# Patient Record
Sex: FEMALE | Race: WHITE | NOT HISPANIC OR LATINO | URBAN - METROPOLITAN AREA
[De-identification: names, ages, dates, MRNs, and addresses within clinical notes are randomized per-mention and may not be internally consistent; named-entity substitution may affect disease eponyms.]

---

## 2019-11-10 ENCOUNTER — EMERGENCY (EMERGENCY)
Facility: HOSPITAL | Age: 44
LOS: 0 days | Discharge: HOME | End: 2019-11-10
Attending: EMERGENCY MEDICINE
Payer: COMMERCIAL

## 2019-11-10 VITALS
TEMPERATURE: 98 F | RESPIRATION RATE: 16 BRPM | HEART RATE: 99 BPM | DIASTOLIC BLOOD PRESSURE: 76 MMHG | SYSTOLIC BLOOD PRESSURE: 119 MMHG | OXYGEN SATURATION: 100 %

## 2019-11-10 VITALS — HEIGHT: 60 IN | WEIGHT: 108.03 LBS

## 2019-11-10 DIAGNOSIS — R21 RASH AND OTHER NONSPECIFIC SKIN ERUPTION: ICD-10-CM

## 2019-11-10 DIAGNOSIS — L02.01 CUTANEOUS ABSCESS OF FACE: ICD-10-CM

## 2019-11-10 PROCEDURE — 99282 EMERGENCY DEPT VISIT SF MDM: CPT

## 2019-11-10 PROCEDURE — 99283 EMERGENCY DEPT VISIT LOW MDM: CPT

## 2019-11-10 RX ORDER — AZTREONAM 2 G
1 VIAL (EA) INJECTION
Qty: 14 | Refills: 0
Start: 2019-11-10 | End: 2019-11-16

## 2019-11-10 RX ADMIN — Medication 1 TABLET(S): at 13:48

## 2019-11-10 NOTE — ED STATDOCS - ATTENDING CONTRIBUTION TO CARE
I, Jai Ferrell MD,  performed the initial face to face bedside interview with this patient regarding history of present illness, review of symptoms and relevant past medical, social and family history.  I completed an independent physical examination.  I was the initial provider who evaluated this patient. I have signed out the follow up of any pending tests (i.e. labs, radiological studies) to the ACP.  I have communicated the patient’s plan of care and disposition with the ACP.

## 2019-11-10 NOTE — ED STATDOCS - NS ED ROS FT
Review of Systems:  	•	CONSTITUTIONAL: no fever  	•	SKIN: +R sided cheek erythema and swelling    	•	RESPIRATORY: no shortness of breath  	•	CARDIAC: no chest pain, no palpitations  	•	GI:  no abd pain, no nausea, no vomiting, no diarrhea  	•	GENITO-URINARY:  no dysuria; no hematuria    	•	MUSCULOSKELETAL:  no back pain  	•	NEUROLOGIC: no weakness  	•	ALLERGY: no rhinitis  	•	PSYSCHIATRIC: no anxiety Review of Systems:  	•	CONSTITUTIONAL: no fever  	•	SKIN: +R sided cheek erythema and swelling    	•	RESPIRATORY: no shortness of breath  	•	CARDIAC: no chest pain, no palpitations  	•	GI:  no abd pain, no nausea, no vomiting, no diarrhea    	•	PSYSCHIATRIC: +anxiety

## 2019-11-10 NOTE — ED ADULT NURSE NOTE - CAS DISCH ACCOMP BY
Face to Face Note  -  Durable Medical Equipment    Kristopher Hanley M.D. - NPI: [id number]  I certify that this patient is under my care and that they had a durable medical equipment(DME)face to face encounter by myself that meets the physician DME face-to-face encounter requirements with this patient on:    Date of encounter:   Patient:                    MRN:                       YOB: 2018  [facility]  [id number]  1949     The encounter with the patient was in whole, or in part, for the following medical condition, which is the primary reason for durable medical equipment:  Total Joint Replacement    I certify that, based on my findings, the following durable medical equipment is medically necessary:  3 in 1 Bedside [place].    HOME O2 Saturation Measurements:(Values must be present for Home Oxygen orders)         ,     ,         My Clinical findings support the need for the above equipment due to:  Other - post BRANDEN    Supporting Symptoms: post BRANDEN      Self

## 2019-11-10 NOTE — ED ADULT TRIAGE NOTE - CHIEF COMPLAINT QUOTE
pt c/o rash to right cheek x 5 days pt c/o rash to right cheek x 4 days, rash started as a small bump , patient used warm compress to pop it , not bump has became larger , red, and hard , also feels right periorbital swelling

## 2019-11-10 NOTE — ED STATDOCS - OBJECTIVE STATEMENT
Pertinent HPI/PMH/PSH/FHx/SHx and Review of Systems contained within  HPI:  44 yr old F presents to the ED with R sided facial pain.  Patient reports that she had bump on her right side, and worsened over the past few days with increased swelling.  +dental pain Patient did not taken any medication for the pain. Patient applied hot compress to the area, but did not have any drainage from the area.   PMH/PSH relevant for:  ROS negative for: fever, Chest pain, SOB, Nausea, vomiting, diarrhea, abdominal pain, dysuria, eye pain, throat pain   FamilyHx and SocialHx not otherwise contributory Pertinent HPI/PMH/PSH/FHx/SHx and Review of Systems contained within  HPI:  44 yr old F presents to the ED with R sided facial & redness x4days, new onset.  Patient reports that she had bump on her right maxillary region which she popped with some drainage. worsened over the past few days with increased swelling.  Patient did not taken any medication for the pain. Patient applied hot compress to the area.  PMH/PSH relevant for: none  ROS negative for: fever, Chest pain, SOB, Nausea, vomiting, diarrhea,  eye pain, throat pain, neck pain, pain on EOM  FamilyHx and SocialHx not otherwise contributory

## 2019-11-10 NOTE — ED ADULT NURSE NOTE - OBJECTIVE STATEMENT
Patient presents to the ED with R sided facial pain.  Patient reports that she had bump on her right side, and worsened over the past few days with increased swelling.  +dental pain Patient did not taken any medication for the pain. Patient applied hot compress to the area, but did not have any drainage from the area.

## 2019-11-10 NOTE — ED STATDOCS - NSFOLLOWUPINSTRUCTIONS_ED_ALL_ED_FT
Skin Abscess  ImageA skin abscess is an infected area on or under your skin that contains pus and other material. An abscess can happen almost anywhere on your body. Some abscesses break open (rupture) on their own. Most continue to get worse unless they are treated. The infection can spread deeper into the body and into your blood, which can make you feel sick. Treatment usually involves draining the abscess.    Follow these instructions at home:  Abscess Care     If you have an abscess that has not drained, place a warm, clean, wet washcloth over the abscess several times a day. Do this as told by your doctor.  Follow instructions from your doctor about how to take care of your abscess. Make sure you:    Cover the abscess with a bandage (dressing).  Change your bandage or gauze as told by your doctor.  Wash your hands with soap and water before you change the bandage or gauze. If you cannot use soap and water, use hand .    Check your abscess every day for signs that the infection is getting worse. Check for:    More redness, swelling, or pain.  More fluid or blood.  Warmth.  More pus or a bad smell.    Medicines     Image   Take over-the-counter and prescription medicines only as told by your doctor.  If you were prescribed an antibiotic medicine, take it as told by your doctor. Do not stop taking the antibiotic even if you start to feel better.  General instructions     To avoid spreading the infection:    Do not share personal care items, towels, or hot tubs with others.  Avoid making skin-to-skin contact with other people.    Keep all follow-up visits as told by your doctor. This is important.  Contact a doctor if:  You have more redness, swelling, or pain around your abscess.  You have more fluid or blood coming from your abscess.  Your abscess feels warm when you touch it.  You have more pus or a bad smell coming from your abscess.  You have a fever.  Your muscles ache.  You have chills.  You feel sick.  Get help right away if:  You have very bad (severe) pain.  You see red streaks on your skin spreading away from the abscess.

## 2019-11-10 NOTE — ED STATDOCS - PHYSICAL EXAMINATION
*GEN: No acute distress, well appearing; A+O x3   *HEAD: Normocephalic, Atraumatic  *EYES/NOSE: PERRL & EOMI b/l  *THROAT: airway patent, moist mucous membranes  *NECK: Neck supple, no masses  *PULMONARY: CTA b/l, symmetric breath sounds.   *CARDIAC: s1s2, regular rhythm, no Murmur  *ABDOMEN:  Non Tender, Non Distended, soft, no guarding, no rebound, no masses   *BACK: no CVA tenderness, Normal  spine   *EXTREMITIES: symmetric pulses, 2+ dp & radial pulses, capillary refill < 2 seconds, no cyanosis, no edema   *SKIN:  +quarter sized cellulitis to right maxillary region, non fluctuant not surrounding periorbital region   *NEUROLOGIC: alert, CN 2-12 intact, moves all 4 extremities, full active & passive ROM in all extremities, normal baseline gait  *PSYCH: insight and judgment nl, memory nl, affect nl, thought nl *GEN: No acute distress, well appearing; A+O x3   *HEAD: Atraumatic; +quarter sized cellulitis to right maxillary region, non fluctuant not surrounding periorbital region   *EYES/NOSE: PERRL & EOMI b/l  *THROAT: airway patent, moist mucous membranes; good dentition  *NECK: Neck supple, no masses  *PULMONARY: CTA b/l, symmetric breath sounds.   *CARDIAC: s1s2, regular rhythm, no Murmur  *ABDOMEN:  Non Tender, Non Distended, soft, no guarding, no rebound, no masses   *BACK: no CVA tenderness, Normal  spine   *EXTREMITIES: symmetric pulses, 2+ dp & radial pulses, capillary refill < 2 seconds, no cyanosis, no edema   *SKIN:  +quarter sized cellulitis to right maxillary region, non fluctuant not surrounding periorbital region   *NEUROLOGIC: alert, CN 2-12 intact, moves all 4 extremities, full active & passive ROM in all extremities, normal baseline gait  *PSYCH: insight and judgment nl, memory nl, affect nl, thought nl

## 2019-11-10 NOTE — ED STATDOCS - PROGRESS NOTE DETAILS
43 yo female with no significant PMH presents with pimple and swelling to the R cheek. Pt noticed it a few days ago and tried to pop it and became worse. Pt also states that her daughter and  had MRSA infections in the past. Denies fever, trouble swallowing/breathing/moving her eyes. -Stephen Bradshaw PA-C

## 2019-11-10 NOTE — ED STATDOCS - PATIENT PORTAL LINK FT
You can access the FollowMyHealth Patient Portal offered by [facility] by registering at the following website: [email or web address]. By joining "[employer]’s FollowMyHealth portal, you will also be able to view your health information using other applications (apps) compatible with our system.

## 2019-11-10 NOTE — ED STATDOCS - CLINICAL SUMMARY MEDICAL DECISION MAKING FREE TEXT BOX
Uncomplicated cellulitis to right cheek, no orbital cellulitis, will give Bactrim +quarter sized cellulitis to right maxillary region, non fluctuant not surrounding periorbital region. not amenable to I&D at this time. Uncomplicated cellulitis to right cheek. no orbital cellulitis, no pain on EOM. will give Bactrim. will d/c with return precautions.

## 2019-11-10 NOTE — ED ADULT NURSE NOTE - CHIEF COMPLAINT QUOTE
pt c/o rash to right cheek x 4 days, rash started as a small bump , patient used warm compress to pop it , not bump has became larger , red, and hard , also feels right periorbital swelling

## 2019-11-11 ENCOUNTER — EMERGENCY (EMERGENCY)
Facility: HOSPITAL | Age: 44
LOS: 0 days | Discharge: HOME | End: 2019-11-11
Attending: EMERGENCY MEDICINE
Payer: COMMERCIAL

## 2019-11-11 VITALS
SYSTOLIC BLOOD PRESSURE: 106 MMHG | HEART RATE: 92 BPM | OXYGEN SATURATION: 100 % | RESPIRATION RATE: 18 BRPM | TEMPERATURE: 99 F | DIASTOLIC BLOOD PRESSURE: 50 MMHG

## 2019-11-11 VITALS — WEIGHT: 108.03 LBS | HEIGHT: 60 IN

## 2019-11-11 DIAGNOSIS — L02.01 CUTANEOUS ABSCESS OF FACE: ICD-10-CM

## 2019-11-11 DIAGNOSIS — R21 RASH AND OTHER NONSPECIFIC SKIN ERUPTION: ICD-10-CM

## 2019-11-11 LAB
ALBUMIN SERPL ELPH-MCNC: 4.1 G/DL — SIGNIFICANT CHANGE UP (ref 3.3–5)
ALP SERPL-CCNC: 54 U/L — SIGNIFICANT CHANGE UP (ref 40–120)
ALT FLD-CCNC: 16 U/L — SIGNIFICANT CHANGE UP (ref 12–78)
ANION GAP SERPL CALC-SCNC: 9 MMOL/L — SIGNIFICANT CHANGE UP (ref 5–17)
AST SERPL-CCNC: 12 U/L — LOW (ref 15–37)
BASOPHILS # BLD AUTO: 0.04 K/UL — SIGNIFICANT CHANGE UP (ref 0–0.2)
BASOPHILS NFR BLD AUTO: 0.3 % — SIGNIFICANT CHANGE UP (ref 0–2)
BILIRUB SERPL-MCNC: 1 MG/DL — SIGNIFICANT CHANGE UP (ref 0.2–1.2)
BUN SERPL-MCNC: 12 MG/DL — SIGNIFICANT CHANGE UP (ref 7–23)
CALCIUM SERPL-MCNC: 8.6 MG/DL — SIGNIFICANT CHANGE UP (ref 8.5–10.1)
CHLORIDE SERPL-SCNC: 109 MMOL/L — HIGH (ref 96–108)
CO2 SERPL-SCNC: 22 MMOL/L — SIGNIFICANT CHANGE UP (ref 22–31)
CREAT SERPL-MCNC: 0.85 MG/DL — SIGNIFICANT CHANGE UP (ref 0.5–1.3)
EOSINOPHIL # BLD AUTO: 0.08 K/UL — SIGNIFICANT CHANGE UP (ref 0–0.5)
EOSINOPHIL NFR BLD AUTO: 0.6 % — SIGNIFICANT CHANGE UP (ref 0–6)
GLUCOSE SERPL-MCNC: 87 MG/DL — SIGNIFICANT CHANGE UP (ref 70–99)
HCT VFR BLD CALC: 37.8 % — SIGNIFICANT CHANGE UP (ref 34.5–45)
HGB BLD-MCNC: 12.2 G/DL — SIGNIFICANT CHANGE UP (ref 11.5–15.5)
IMM GRANULOCYTES NFR BLD AUTO: 0.3 % — SIGNIFICANT CHANGE UP (ref 0–1.5)
LYMPHOCYTES # BLD AUTO: 1.82 K/UL — SIGNIFICANT CHANGE UP (ref 1–3.3)
LYMPHOCYTES # BLD AUTO: 13.9 % — SIGNIFICANT CHANGE UP (ref 13–44)
MCHC RBC-ENTMCNC: 29.2 PG — SIGNIFICANT CHANGE UP (ref 27–34)
MCHC RBC-ENTMCNC: 32.3 GM/DL — SIGNIFICANT CHANGE UP (ref 32–36)
MCV RBC AUTO: 90.4 FL — SIGNIFICANT CHANGE UP (ref 80–100)
MONOCYTES # BLD AUTO: 0.76 K/UL — SIGNIFICANT CHANGE UP (ref 0–0.9)
MONOCYTES NFR BLD AUTO: 5.8 % — SIGNIFICANT CHANGE UP (ref 2–14)
NEUTROPHILS # BLD AUTO: 10.35 K/UL — HIGH (ref 1.8–7.4)
NEUTROPHILS NFR BLD AUTO: 79.1 % — HIGH (ref 43–77)
PLATELET # BLD AUTO: 216 K/UL — SIGNIFICANT CHANGE UP (ref 150–400)
POTASSIUM SERPL-MCNC: 3.7 MMOL/L — SIGNIFICANT CHANGE UP (ref 3.5–5.3)
POTASSIUM SERPL-SCNC: 3.7 MMOL/L — SIGNIFICANT CHANGE UP (ref 3.5–5.3)
PROT SERPL-MCNC: 7.5 GM/DL — SIGNIFICANT CHANGE UP (ref 6–8.3)
RBC # BLD: 4.18 M/UL — SIGNIFICANT CHANGE UP (ref 3.8–5.2)
RBC # FLD: 13.6 % — SIGNIFICANT CHANGE UP (ref 10.3–14.5)
SODIUM SERPL-SCNC: 140 MMOL/L — SIGNIFICANT CHANGE UP (ref 135–145)
WBC # BLD: 13.09 K/UL — HIGH (ref 3.8–10.5)
WBC # FLD AUTO: 13.09 K/UL — HIGH (ref 3.8–10.5)

## 2019-11-11 PROCEDURE — 96374 THER/PROPH/DIAG INJ IV PUSH: CPT

## 2019-11-11 PROCEDURE — 99284 EMERGENCY DEPT VISIT MOD MDM: CPT

## 2019-11-11 PROCEDURE — 36415 COLL VENOUS BLD VENIPUNCTURE: CPT

## 2019-11-11 PROCEDURE — 99284 EMERGENCY DEPT VISIT MOD MDM: CPT | Mod: 25

## 2019-11-11 PROCEDURE — 87040 BLOOD CULTURE FOR BACTERIA: CPT

## 2019-11-11 PROCEDURE — 85025 COMPLETE CBC W/AUTO DIFF WBC: CPT

## 2019-11-11 PROCEDURE — 80053 COMPREHEN METABOLIC PANEL: CPT

## 2019-11-11 RX ORDER — LIDOCAINE AND PRILOCAINE CREAM 25; 25 MG/G; MG/G
1 CREAM TOPICAL ONCE
Refills: 0 | Status: COMPLETED | OUTPATIENT
Start: 2019-11-11 | End: 2019-11-11

## 2019-11-11 RX ORDER — CEPHALEXIN 500 MG
1 CAPSULE ORAL
Qty: 28 | Refills: 0
Start: 2019-11-11 | End: 2019-11-17

## 2019-11-11 RX ORDER — IBUPROFEN 200 MG
1 TABLET ORAL
Qty: 20 | Refills: 0
Start: 2019-11-11 | End: 2019-11-15

## 2019-11-11 RX ORDER — LIDOCAINE/EPINEPHR/TETRACAINE 4-0.09-0.5
1 GEL WITH PREFILLED APPLICATOR (ML) TOPICAL ONCE
Refills: 0 | Status: DISCONTINUED | OUTPATIENT
Start: 2019-11-11 | End: 2019-11-11

## 2019-11-11 RX ORDER — ALPRAZOLAM 0.25 MG
0.25 TABLET ORAL ONCE
Refills: 0 | Status: DISCONTINUED | OUTPATIENT
Start: 2019-11-11 | End: 2019-11-11

## 2019-11-11 RX ADMIN — Medication 100 MILLIGRAM(S): at 12:27

## 2019-11-11 RX ADMIN — LIDOCAINE AND PRILOCAINE CREAM 1 APPLICATION(S): 25; 25 CREAM TOPICAL at 12:20

## 2019-11-11 RX ADMIN — Medication 0.25 MILLIGRAM(S): at 12:38

## 2019-11-11 NOTE — ED STATDOCS - OBJECTIVE STATEMENT
45 y/o female with no pertinent PMHx presents to the ED c/o cellulitis on face starting Wednesday. Pt states last night it was so painful and it hurt to touch. Pt was at [facility] yesterday where they told her it was cellulitis and gave her Bactrim. Pt states it was quarter sized yesterday and was told to come back to ED if it got bigger.

## 2019-11-11 NOTE — ED STATDOCS - PROGRESS NOTE DETAILS
43 y/o F presents with facial cellulitis. Pt states she was here yesterday and started on bactrim. Today area was bigger, and more painful. Denies fever/chills, n/v, or other complaints at this time  Skin: 5mm abscess to R cheek with 3cm of surrounding induration. Mild surrounding erythema. TTP Pt requesting plastics, call placed. -Bashir Berrios PA-C pt seen by plastics, I and D performed, no pus expressed.   Will add keflex. Pt to FU with plastics on Wednesday. Return to ED immediately for any new or concerning symptoms or worsening symptoms. -Bashir Berrios PA-C

## 2019-11-11 NOTE — ED STATDOCS - CARE PROVIDER_API CALL
Bisi Beavers)  Plastic Surgery  [address] [place] [address], [address]  [place] [address]  Phone: [phone]  Fax: [phone]  Follow Up Time:

## 2019-11-11 NOTE — ED ADULT NURSE NOTE - OBJECTIVE STATEMENT
Pt c/o cellulitis on face starting Wednesday. Pt states last night it was so painful and it hurt to touch. Pt was at [facility] yesterday where they told her it was cellulitis and gave her Bactrim. Pt states it was quarter sized yesterday and was told to come back to ED if it got bigger.

## 2019-11-11 NOTE — ED STATDOCS - PATIENT PORTAL LINK FT
You can access the FollowMyHealth Patient Portal offered by [facility] by registering at the following website: [email or web address]. By joining [employer]’s FollowMyHealth portal, you will also be able to view your health information using other applications (apps) compatible with our system.

## 2019-11-11 NOTE — ED STATDOCS - SKIN, MLM
skin normal color for race, warm +boil to right cheek with surrounding cellulitis and no active drainage

## 2019-11-11 NOTE — ED STATDOCS - NSFOLLOWUPINSTRUCTIONS_ED_ALL_ED_FT
Skin Abscess  ImageA skin abscess is an infected area on or under your skin that contains pus and other material. An abscess can happen almost anywhere on your body. Some abscesses break open (rupture) on their own. Most continue to get worse unless they are treated. The infection can spread deeper into the body and into your blood, which can make you feel sick. Treatment usually involves draining the abscess.    Follow these instructions at home:  Abscess Care     If you have an abscess that has not drained, place a warm, clean, wet washcloth over the abscess several times a day. Do this as told by your doctor.  Follow instructions from your doctor about how to take care of your abscess. Make sure you:    Cover the abscess with a bandage (dressing).  Change your bandage or gauze as told by your doctor.  Wash your hands with soap and water before you change the bandage or gauze. If you cannot use soap and water, use hand .    Check your abscess every day for signs that the infection is getting worse. Check for:    More redness, swelling, or pain.  More fluid or blood.  Warmth.  More pus or a bad smell.    Medicines     Image   Take over-the-counter and prescription medicines only as told by your doctor.  If you were prescribed an antibiotic medicine, take it as told by your doctor. Do not stop taking the antibiotic even if you start to feel better.  General instructions     To avoid spreading the infection:    Do not share personal care items, towels, or hot tubs with others.  Avoid making skin-to-skin contact with other people.    Keep all follow-up visits as told by your doctor. This is important.  Contact a doctor if:  You have more redness, swelling, or pain around your abscess.  You have more fluid or blood coming from your abscess.  Your abscess feels warm when you touch it.  You have more pus or a bad smell coming from your abscess.  You have a fever.  Your muscles ache.  You have chills.  You feel sick.  Get help right away if:  You have very bad (severe) pain.  You see red streaks on your skin spreading away from the abscess. Skin Abscess  skin abscess is an infected area on or under your skin that contains pus and other material. An abscess can happen almost anywhere on your body. Some abscesses break open (rupture) on their own. Most continue to get worse unless they are treated. The infection can spread deeper into the body and into your blood, which can make you feel sick. Treatment usually involves draining the abscess.    Follow these instructions at home:  Abscess Care     If you have an abscess that has not drained, place a warm, clean, wet washcloth over the abscess several times a day. Do this as told by your doctor.  Follow instructions from your doctor about how to take care of your abscess. Make sure you:    Cover the abscess with a bandage (dressing).  Change your bandage or gauze as told by your doctor.  Wash your hands with soap and water before you change the bandage or gauze. If you cannot use soap and water, use hand .    Check your abscess every day for signs that the infection is getting worse. Check for:    More redness, swelling, or pain.  More fluid or blood.  Warmth.  More pus or a bad smell.    Medicines     Image   Take over-the-counter and prescription medicines only as told by your doctor.  If you were prescribed an antibiotic medicine, take it as told by your doctor. Do not stop taking the antibiotic even if you start to feel better.  General instructions     To avoid spreading the infection:    Do not share personal care items, towels, or hot tubs with others.  Avoid making skin-to-skin contact with other people.    Keep all follow-up visits as told by your doctor. This is important.  Contact a doctor if:  You have more redness, swelling, or pain around your abscess.  You have more fluid or blood coming from your abscess.  Your abscess feels warm when you touch it.  You have more pus or a bad smell coming from your abscess.  You have a fever.  Your muscles ache.  You have chills.  You feel sick.  Get help right away if:  You have very bad (severe) pain.  You see red streaks on your skin spreading away from the abscess.

## 2019-11-11 NOTE — ED ADULT TRIAGE NOTE - CHIEF COMPLAINT QUOTE
Pt seen yesterday for cellulitis to right face. Pt states that it has worsened in the past 24 hours, and was told to return to ED if it worsened.

## 2019-11-12 ENCOUNTER — INPATIENT (INPATIENT)
Facility: HOSPITAL | Age: 44
LOS: 4 days | Discharge: HOME | DRG: 872 | End: 2019-11-17
Attending: INTERNAL MEDICINE | Admitting: INTERNAL MEDICINE
Payer: COMMERCIAL

## 2019-11-12 VITALS — HEIGHT: 60 IN | WEIGHT: 108.03 LBS

## 2019-11-12 LAB
ALBUMIN SERPL ELPH-MCNC: 4.1 G/DL — SIGNIFICANT CHANGE UP (ref 3.3–5)
ALP SERPL-CCNC: 49 U/L — SIGNIFICANT CHANGE UP (ref 40–120)
ALT FLD-CCNC: 14 U/L — SIGNIFICANT CHANGE UP (ref 12–78)
ANION GAP SERPL CALC-SCNC: 9 MMOL/L — SIGNIFICANT CHANGE UP (ref 5–17)
AST SERPL-CCNC: 13 U/L — LOW (ref 15–37)
BASOPHILS # BLD AUTO: 0.05 K/UL — SIGNIFICANT CHANGE UP (ref 0–0.2)
BASOPHILS NFR BLD AUTO: 0.4 % — SIGNIFICANT CHANGE UP (ref 0–2)
BILIRUB SERPL-MCNC: 0.6 MG/DL — SIGNIFICANT CHANGE UP (ref 0.2–1.2)
BUN SERPL-MCNC: 9 MG/DL — SIGNIFICANT CHANGE UP (ref 7–23)
CALCIUM SERPL-MCNC: 8.6 MG/DL — SIGNIFICANT CHANGE UP (ref 8.5–10.1)
CHLORIDE SERPL-SCNC: 108 MMOL/L — SIGNIFICANT CHANGE UP (ref 96–108)
CO2 SERPL-SCNC: 23 MMOL/L — SIGNIFICANT CHANGE UP (ref 22–31)
CREAT SERPL-MCNC: 0.77 MG/DL — SIGNIFICANT CHANGE UP (ref 0.5–1.3)
EOSINOPHIL # BLD AUTO: 0.06 K/UL — SIGNIFICANT CHANGE UP (ref 0–0.5)
EOSINOPHIL NFR BLD AUTO: 0.5 % — SIGNIFICANT CHANGE UP (ref 0–6)
GLUCOSE SERPL-MCNC: 85 MG/DL — SIGNIFICANT CHANGE UP (ref 70–99)
HCT VFR BLD CALC: 37.6 % — SIGNIFICANT CHANGE UP (ref 34.5–45)
HGB BLD-MCNC: 12.1 G/DL — SIGNIFICANT CHANGE UP (ref 11.5–15.5)
IMM GRANULOCYTES NFR BLD AUTO: 0.3 % — SIGNIFICANT CHANGE UP (ref 0–1.5)
LACTATE SERPL-SCNC: 1.6 MMOL/L — SIGNIFICANT CHANGE UP (ref 0.7–2)
LYMPHOCYTES # BLD AUTO: 1.54 K/UL — SIGNIFICANT CHANGE UP (ref 1–3.3)
LYMPHOCYTES # BLD AUTO: 12.5 % — LOW (ref 13–44)
MCHC RBC-ENTMCNC: 29.2 PG — SIGNIFICANT CHANGE UP (ref 27–34)
MCHC RBC-ENTMCNC: 32.2 GM/DL — SIGNIFICANT CHANGE UP (ref 32–36)
MCV RBC AUTO: 90.8 FL — SIGNIFICANT CHANGE UP (ref 80–100)
MONOCYTES # BLD AUTO: 0.68 K/UL — SIGNIFICANT CHANGE UP (ref 0–0.9)
MONOCYTES NFR BLD AUTO: 5.5 % — SIGNIFICANT CHANGE UP (ref 2–14)
NEUTROPHILS # BLD AUTO: 9.97 K/UL — HIGH (ref 1.8–7.4)
NEUTROPHILS NFR BLD AUTO: 80.8 % — HIGH (ref 43–77)
PLATELET # BLD AUTO: 204 K/UL — SIGNIFICANT CHANGE UP (ref 150–400)
POTASSIUM SERPL-MCNC: 3.6 MMOL/L — SIGNIFICANT CHANGE UP (ref 3.5–5.3)
POTASSIUM SERPL-SCNC: 3.6 MMOL/L — SIGNIFICANT CHANGE UP (ref 3.5–5.3)
PROT SERPL-MCNC: 7.7 GM/DL — SIGNIFICANT CHANGE UP (ref 6–8.3)
RBC # BLD: 4.14 M/UL — SIGNIFICANT CHANGE UP (ref 3.8–5.2)
RBC # FLD: 13.7 % — SIGNIFICANT CHANGE UP (ref 10.3–14.5)
SODIUM SERPL-SCNC: 140 MMOL/L — SIGNIFICANT CHANGE UP (ref 135–145)
WBC # BLD: 12.34 K/UL — HIGH (ref 3.8–10.5)
WBC # FLD AUTO: 12.34 K/UL — HIGH (ref 3.8–10.5)

## 2019-11-12 PROCEDURE — 70487 CT MAXILLOFACIAL W/DYE: CPT | Mod: 26

## 2019-11-12 RX ORDER — KETOROLAC TROMETHAMINE 30 MG/ML
30 SYRINGE (ML) INJECTION ONCE
Refills: 0 | Status: DISCONTINUED | OUTPATIENT
Start: 2019-11-12 | End: 2019-11-12

## 2019-11-12 RX ORDER — SODIUM CHLORIDE 9 MG/ML
1000 INJECTION INTRAMUSCULAR; INTRAVENOUS; SUBCUTANEOUS ONCE
Refills: 0 | Status: COMPLETED | OUTPATIENT
Start: 2019-11-12 | End: 2019-11-12

## 2019-11-12 RX ORDER — VANCOMYCIN HCL 1 G
1000 VIAL (EA) INTRAVENOUS ONCE
Refills: 0 | Status: DISCONTINUED | OUTPATIENT
Start: 2019-11-12 | End: 2019-11-12

## 2019-11-12 RX ORDER — CEFTRIAXONE 500 MG/1
1000 INJECTION, POWDER, FOR SOLUTION INTRAMUSCULAR; INTRAVENOUS ONCE
Refills: 0 | Status: COMPLETED | OUTPATIENT
Start: 2019-11-12 | End: 2019-11-12

## 2019-11-12 RX ORDER — VANCOMYCIN HCL 1 G
750 VIAL (EA) INTRAVENOUS ONCE
Refills: 0 | Status: COMPLETED | OUTPATIENT
Start: 2019-11-12 | End: 2019-11-12

## 2019-11-12 RX ORDER — OXYCODONE AND ACETAMINOPHEN 5; 325 MG/1; MG/1
1 TABLET ORAL ONCE
Refills: 0 | Status: DISCONTINUED | OUTPATIENT
Start: 2019-11-12 | End: 2019-11-12

## 2019-11-12 RX ORDER — DIPHENHYDRAMINE HCL 50 MG
25 CAPSULE ORAL ONCE
Refills: 0 | Status: COMPLETED | OUTPATIENT
Start: 2019-11-12 | End: 2019-11-12

## 2019-11-12 RX ADMIN — Medication 30 MILLIGRAM(S): at 22:47

## 2019-11-12 RX ADMIN — SODIUM CHLORIDE 2000 MILLILITER(S): 9 INJECTION INTRAMUSCULAR; INTRAVENOUS; SUBCUTANEOUS at 19:57

## 2019-11-12 RX ADMIN — Medication 30 MILLIGRAM(S): at 22:46

## 2019-11-12 RX ADMIN — Medication 25 MILLIGRAM(S): at 20:28

## 2019-11-12 RX ADMIN — SODIUM CHLORIDE 1000 MILLILITER(S): 9 INJECTION INTRAMUSCULAR; INTRAVENOUS; SUBCUTANEOUS at 22:26

## 2019-11-12 RX ADMIN — Medication 250 MILLIGRAM(S): at 19:57

## 2019-11-12 RX ADMIN — CEFTRIAXONE 1000 MILLIGRAM(S): 500 INJECTION, POWDER, FOR SOLUTION INTRAMUSCULAR; INTRAVENOUS at 19:57

## 2019-11-12 NOTE — ED STATDOCS - PROGRESS NOTE DETAILS
45 y/o female with no pertinent PMHx presents to the ED sent by PMD Dr. Richardson for admission regarding right cheek cellulitis. States it started with a small red spot to her right cheek 1 week ago that has worsened with swelling and redness. Area is painful to touch. Denies any other pain at this time. Was seen yesterday at [facility], evaluated by plastics Dr. Beavers who attempted to drain the area. Pt was prescribed Bactrim and Keflex with no relief. PMD: Dylan.   Arleth Osborne PA-C Pt. with cellulitis and indurated abscess right cheek.  Erythema extending to lower right eyelid.  Neg. palpable fluctuance.  Pt. states she has been "squeezing" it and applying hot compresses, mild pus expressed.  Neg. pain with eye movement.  Arleth Osborne PA-C Pt. started to feel "tickle" in throat when Vancomycin was being infused.  Infusion stopped and Benadryl 25mg IVP administered.  Neg. erythema noted to skin.  Arleth Osborne PA-C Facial CT with Right facial cellulitis, no evidence of drainable abscess.  Discussed case with Hospitalist.  Will admit.  Pt met Sepsis criteria on presentation.  HR was elevated to 96.  WBC was 12.  Was given 1000 ml of IVF bolus initally.  Added on additional 500 ml bolus to meet 30cc / kg bolus.  Blood Cx were collected.  Lactate was 1.6.  Informed Dr. Fink of CT findings.  Ileana Barajsa PA-C

## 2019-11-12 NOTE — ED STATDOCS - CARE PLAN
Principal Discharge DX:	Cellulitis of face  Secondary Diagnosis:	SIRS (systemic inflammatory response syndrome)

## 2019-11-12 NOTE — ED ADULT TRIAGE NOTE - CHIEF COMPLAINT QUOTE
Patient comes to ED for right cheek skin infection. pt had small red spot on Wednesday then developed swelling and redness worsening. pt was placed on 2 PO abx with no relief. pt then had IV abx yesterday and redness and swelling worsened. pt was seen by PCP and sent for admission. pt denies any fevers or chills

## 2019-11-12 NOTE — ED ADULT NURSE NOTE - OBJECTIVE STATEMENT
Patient presents to ED complaining of facial swelling. Patient complaining of R sided facial swelling and redness x 1 week. Patient states she noticed a small "pimple like" spot on R cheek, area has progressively worsened, increased swelling, pain, redness. Patient was seen in ED yesterday, received IV ABX and went home on oral ABX. Patient complaining of worsening pain and swelling. Patient dneies fevers, chills, NVD, CP, SOB

## 2019-11-12 NOTE — PHARMACOTHERAPY INTERVENTION NOTE - COMMENTS
[facility] is complete[facility] and confirmed with patient and spouse at bedside. Patient and  seemed concerned about multiple abx being used and stopped too early. Multiple questions were addressed regarding patients abx.

## 2019-11-12 NOTE — ED STATDOCS - OBJECTIVE STATEMENT
45 y/o female with no pertinent PMHx presents to the ED sent by PMD Dr. Richardson for admission regarding right cheek cellulitis. States it started with a small red spot to her right cheek 1 week ago that has worsened with swelling and redness. Area is painful to touch. Denies any other pain at this time. Was seen yesterday at [facility], evaluated by plastics Dr. Beavers who attempted to drain the area. Pt was prescribed Bactrim and Keflex with no relief. PMD: Dylan.

## 2019-11-12 NOTE — ED ADULT NURSE REASSESSMENT NOTE - NS ED NURSE REASSESS COMMENT FT1
Patient states she feels like she cant clear her throat. IV vancomycin stopped. Patient able to speak without difficulty, breathing unlabored and symmetrical. Patient denies difficulty breathing, swelling in mouth or throat. MD NELIDA Nicole made aware, benadryl ordered and administered, will continue to monitor

## 2019-11-12 NOTE — ED STATDOCS - ATTENDING CONTRIBUTION TO CARE
I, Bryan Carlin, performed the initial face to face bedside interview with this patient regarding history of present illness, review of symptoms and relevant past medical, social and family history.  I completed an independent physical examination.  I was the initial provider who evaluated this patient. I have signed out the follow up of any pending tests (i.e. labs, radiological studies) to the ACP.  I have communicated the patient’s plan of care and disposition with the ACP.  The history, relevant review of systems, past medical and surgical history, medical decision making, and physical examination was documented by the scribe in my presence and I attest to the accuracy of the documentation.

## 2019-11-12 NOTE — ED STATDOCS - SKIN, MLM
skin dry and intact. +indurated, erythematous, tender and warm 4cm diameter +puncture wound where I&D performed

## 2019-11-13 DIAGNOSIS — L03.211 CELLULITIS OF FACE: ICD-10-CM

## 2019-11-13 PROBLEM — Z78.9 OTHER SPECIFIED HEALTH STATUS: Chronic | Status: ACTIVE | Noted: 2019-11-11

## 2019-11-13 LAB
ALBUMIN SERPL ELPH-MCNC: 2.8 G/DL — LOW (ref 3.3–5)
ALP SERPL-CCNC: 35 U/L — LOW (ref 40–120)
ALT FLD-CCNC: 12 U/L — SIGNIFICANT CHANGE UP (ref 12–78)
ANION GAP SERPL CALC-SCNC: 7 MMOL/L — SIGNIFICANT CHANGE UP (ref 5–17)
AST SERPL-CCNC: 10 U/L — LOW (ref 15–37)
BASOPHILS # BLD AUTO: 0.03 K/UL — SIGNIFICANT CHANGE UP (ref 0–0.2)
BASOPHILS NFR BLD AUTO: 0.4 % — SIGNIFICANT CHANGE UP (ref 0–2)
BILIRUB SERPL-MCNC: 0.6 MG/DL — SIGNIFICANT CHANGE UP (ref 0.2–1.2)
BUN SERPL-MCNC: 6 MG/DL — LOW (ref 7–23)
CALCIUM SERPL-MCNC: 7.6 MG/DL — LOW (ref 8.5–10.1)
CHLORIDE SERPL-SCNC: 113 MMOL/L — HIGH (ref 96–108)
CO2 SERPL-SCNC: 22 MMOL/L — SIGNIFICANT CHANGE UP (ref 22–31)
CREAT SERPL-MCNC: 0.6 MG/DL — SIGNIFICANT CHANGE UP (ref 0.5–1.3)
EOSINOPHIL # BLD AUTO: 0.16 K/UL — SIGNIFICANT CHANGE UP (ref 0–0.5)
EOSINOPHIL NFR BLD AUTO: 2.2 % — SIGNIFICANT CHANGE UP (ref 0–6)
GLUCOSE SERPL-MCNC: 91 MG/DL — SIGNIFICANT CHANGE UP (ref 70–99)
HCT VFR BLD CALC: 30.2 % — LOW (ref 34.5–45)
HGB BLD-MCNC: 10 G/DL — LOW (ref 11.5–15.5)
IMM GRANULOCYTES NFR BLD AUTO: 0.3 % — SIGNIFICANT CHANGE UP (ref 0–1.5)
INR BLD: 1.12 RATIO — SIGNIFICANT CHANGE UP (ref 0.88–1.16)
LYMPHOCYTES # BLD AUTO: 1.71 K/UL — SIGNIFICANT CHANGE UP (ref 1–3.3)
LYMPHOCYTES # BLD AUTO: 23 % — SIGNIFICANT CHANGE UP (ref 13–44)
MAGNESIUM SERPL-MCNC: 2.1 MG/DL — SIGNIFICANT CHANGE UP (ref 1.6–2.6)
MCHC RBC-ENTMCNC: 29.9 PG — SIGNIFICANT CHANGE UP (ref 27–34)
MCHC RBC-ENTMCNC: 33.1 GM/DL — SIGNIFICANT CHANGE UP (ref 32–36)
MCV RBC AUTO: 90.1 FL — SIGNIFICANT CHANGE UP (ref 80–100)
MONOCYTES # BLD AUTO: 0.57 K/UL — SIGNIFICANT CHANGE UP (ref 0–0.9)
MONOCYTES NFR BLD AUTO: 7.7 % — SIGNIFICANT CHANGE UP (ref 2–14)
NEUTROPHILS # BLD AUTO: 4.95 K/UL — SIGNIFICANT CHANGE UP (ref 1.8–7.4)
NEUTROPHILS NFR BLD AUTO: 66.4 % — SIGNIFICANT CHANGE UP (ref 43–77)
PHOSPHATE SERPL-MCNC: 3.6 MG/DL — SIGNIFICANT CHANGE UP (ref 2.5–4.5)
PLATELET # BLD AUTO: 169 K/UL — SIGNIFICANT CHANGE UP (ref 150–400)
POTASSIUM SERPL-MCNC: 4 MMOL/L — SIGNIFICANT CHANGE UP (ref 3.5–5.3)
POTASSIUM SERPL-SCNC: 4 MMOL/L — SIGNIFICANT CHANGE UP (ref 3.5–5.3)
PROT SERPL-MCNC: 5.7 GM/DL — LOW (ref 6–8.3)
PROTHROM AB SERPL-ACNC: 12.5 SEC — SIGNIFICANT CHANGE UP (ref 10–12.9)
RBC # BLD: 3.35 M/UL — LOW (ref 3.8–5.2)
RBC # FLD: 13.8 % — SIGNIFICANT CHANGE UP (ref 10.3–14.5)
SODIUM SERPL-SCNC: 142 MMOL/L — SIGNIFICANT CHANGE UP (ref 135–145)
WBC # BLD: 7.44 K/UL — SIGNIFICANT CHANGE UP (ref 3.8–10.5)
WBC # FLD AUTO: 7.44 K/UL — SIGNIFICANT CHANGE UP (ref 3.8–10.5)

## 2019-11-13 PROCEDURE — 83735 ASSAY OF MAGNESIUM: CPT

## 2019-11-13 PROCEDURE — 84100 ASSAY OF PHOSPHORUS: CPT

## 2019-11-13 PROCEDURE — 85025 COMPLETE CBC W/AUTO DIFF WBC: CPT

## 2019-11-13 PROCEDURE — 87186 SC STD MICRODIL/AGAR DIL: CPT

## 2019-11-13 PROCEDURE — 36415 COLL VENOUS BLD VENIPUNCTURE: CPT

## 2019-11-13 PROCEDURE — 80053 COMPREHEN METABOLIC PANEL: CPT

## 2019-11-13 PROCEDURE — 85610 PROTHROMBIN TIME: CPT

## 2019-11-13 PROCEDURE — 85027 COMPLETE CBC AUTOMATED: CPT

## 2019-11-13 PROCEDURE — 80202 ASSAY OF VANCOMYCIN: CPT

## 2019-11-13 PROCEDURE — 87070 CULTURE OTHR SPECIMN AEROBIC: CPT

## 2019-11-13 PROCEDURE — 80048 BASIC METABOLIC PNL TOTAL CA: CPT

## 2019-11-13 PROCEDURE — 87045 FECES CULTURE AEROBIC BACT: CPT

## 2019-11-13 RX ORDER — VANCOMYCIN HCL 1 G
1000 VIAL (EA) INTRAVENOUS EVERY 12 HOURS
Refills: 0 | Status: DISCONTINUED | OUTPATIENT
Start: 2019-11-13 | End: 2019-11-13

## 2019-11-13 RX ORDER — CIPROFLOXACIN LACTATE 400MG/40ML
400 VIAL (ML) INTRAVENOUS EVERY 12 HOURS
Refills: 0 | Status: DISCONTINUED | OUTPATIENT
Start: 2019-11-13 | End: 2019-11-13

## 2019-11-13 RX ORDER — IBUPROFEN 200 MG
600 TABLET ORAL EVERY 6 HOURS
Refills: 0 | Status: DISCONTINUED | OUTPATIENT
Start: 2019-11-13 | End: 2019-11-17

## 2019-11-13 RX ORDER — SODIUM CHLORIDE 9 MG/ML
1000 INJECTION INTRAMUSCULAR; INTRAVENOUS; SUBCUTANEOUS
Refills: 0 | Status: DISCONTINUED | OUTPATIENT
Start: 2019-11-13 | End: 2019-11-14

## 2019-11-13 RX ORDER — ALPRAZOLAM 0.25 MG
0.25 TABLET ORAL ONCE
Refills: 0 | Status: DISCONTINUED | OUTPATIENT
Start: 2019-11-13 | End: 2019-11-13

## 2019-11-13 RX ORDER — CEFTRIAXONE 500 MG/1
2000 INJECTION, POWDER, FOR SOLUTION INTRAMUSCULAR; INTRAVENOUS EVERY 24 HOURS
Refills: 0 | Status: DISCONTINUED | OUTPATIENT
Start: 2019-11-13 | End: 2019-11-17

## 2019-11-13 RX ORDER — VANCOMYCIN HCL 1 G
1000 VIAL (EA) INTRAVENOUS EVERY 12 HOURS
Refills: 0 | Status: DISCONTINUED | OUTPATIENT
Start: 2019-11-13 | End: 2019-11-17

## 2019-11-13 RX ORDER — SODIUM CHLORIDE 9 MG/ML
500 INJECTION INTRAMUSCULAR; INTRAVENOUS; SUBCUTANEOUS ONCE
Refills: 0 | Status: COMPLETED | OUTPATIENT
Start: 2019-11-13 | End: 2019-11-13

## 2019-11-13 RX ORDER — OXYCODONE HYDROCHLORIDE 5 MG/1
10 TABLET ORAL EVERY 4 HOURS
Refills: 0 | Status: DISCONTINUED | OUTPATIENT
Start: 2019-11-13 | End: 2019-11-13

## 2019-11-13 RX ORDER — LACTOBACILLUS ACIDOPHILUS 100MM CELL
1 CAPSULE ORAL
Refills: 0 | Status: DISCONTINUED | OUTPATIENT
Start: 2019-11-13 | End: 2019-11-15

## 2019-11-13 RX ADMIN — Medication 600 MILLIGRAM(S): at 06:43

## 2019-11-13 RX ADMIN — Medication 1 TABLET(S): at 16:53

## 2019-11-13 RX ADMIN — SODIUM CHLORIDE 500 MILLILITER(S): 9 INJECTION INTRAMUSCULAR; INTRAVENOUS; SUBCUTANEOUS at 00:39

## 2019-11-13 RX ADMIN — Medication 250 MILLIGRAM(S): at 14:41

## 2019-11-13 RX ADMIN — Medication 600 MILLIGRAM(S): at 10:34

## 2019-11-13 RX ADMIN — Medication 0.25 MILLIGRAM(S): at 23:06

## 2019-11-13 RX ADMIN — Medication 200 MILLIGRAM(S): at 06:16

## 2019-11-13 RX ADMIN — CEFTRIAXONE 2000 MILLIGRAM(S): 500 INJECTION, POWDER, FOR SOLUTION INTRAMUSCULAR; INTRAVENOUS at 13:36

## 2019-11-13 RX ADMIN — Medication 0.25 MILLIGRAM(S): at 01:56

## 2019-11-13 RX ADMIN — Medication 600 MILLIGRAM(S): at 16:53

## 2019-11-13 RX ADMIN — Medication 600 MILLIGRAM(S): at 16:16

## 2019-11-13 RX ADMIN — SODIUM CHLORIDE 75 MILLILITER(S): 9 INJECTION INTRAMUSCULAR; INTRAVENOUS; SUBCUTANEOUS at 02:10

## 2019-11-13 RX ADMIN — Medication 600 MILLIGRAM(S): at 02:08

## 2019-11-13 RX ADMIN — Medication 600 MILLIGRAM(S): at 08:58

## 2019-11-13 RX ADMIN — Medication 100 MILLIGRAM(S): at 05:36

## 2019-11-13 NOTE — CONSULT NOTE ADULT - ASSESSMENT
43 y/o F with h/o C sections x 3 was admitted on 11/13 for R cheek erythema / edema. Patient states that 6 days prior to admission; she noticed a pimple and she popped it. Soon after area became erythematous and swollen and patient came to ED on 11/10 and was given antibiotics. However, it did not improve. Patient also saw a plastic surgeon who attempted to drain it, but the area continued to be swollen/erythematous. Patient was prescribed oral antibiotics, but they did not help. In ER she received vancomycin IV, ceftriaxone, clindamycin and cipro.    1. Right face cellulitis. 45 y/o F with h/o C sections x 3 was admitted on 11/13 for R cheek erythema / edema. Patient states that 6 days prior to admission; she noticed a pimple and she popped it. Soon after area became erythematous and swollen and patient came to ED on 11/10 and was given antibiotics. However, it did not improve. Patient also saw a plastic surgeon who attempted to drain it, but the area continued to be swollen/erythematous. Patient was prescribed oral antibiotics, but they did not help. In ER she received vancomycin IV, ceftriaxone, clindamycin and cipro.    1. Right face cellulitis. Possible infection with MRSA.  -she did not improve of oral bactim, then cephalexin  -"scratchy throat" after infusions on ceftriaxone and vancomycin IV ?allergic reaction vs infusion reaction due to vancomycin  -abx options d/w patient; risks and benefits reviewed  -start vancomycin 1 gm IV q12h and ceftriaxone 2 gm IV qd  -reason for abx use and side effects reviewed with patient; monitor BMP and vancomycin trough levels   -monitor closely in mari of recent history  -infuse vancomycin slowly  -case reviewed with RN staff for close monitoring  -old chart reviewed to assess prior cultures  -monitor temps  -f/u CBC  -supportive care  2. Other issues:   -care per medicine

## 2019-11-13 NOTE — CONSULT NOTE ADULT - SUBJECTIVE AND OBJECTIVE BOX
Patient is a 44y old  Female who presents with a chief complaint of R facial swelling / erythema    HPI:  45 y/o F with h/o C sections x 3 was admitted on 11/13 for R cheek erythema / edema. Patient states that 6 days prior to admission; she noticed a pimple and she popped it. Soon after area became erythematous and swollen and patient came to ED on 11/10 and was given antibiotics. However, it did not improve. Patient also saw a plastic surgeon who attempted to drain it, but the area continued to be swollen/erythematous. Patient was prescribed oral antibiotics, but they did not help. In ER she received vancomycin IV, ceftriaxone, clindamycin and cipro.    PMH: as above  PSH: as above  Meds: per reconciliation sheet, noted below  MEDICATIONS  (STANDING):  ciprofloxacin   IVPB 400 milliGRAM(s) IV Intermittent every 12 hours  clindamycin IVPB 600 milliGRAM(s) IV Intermittent every 8 hours  sodium chloride 0.9%. 1000 milliLiter(s) (75 mL/Hr) IV Continuous <Continuous>    MEDICATIONS  (PRN):  ibuprofen  Tablet. 600 milliGRAM(s) Oral every 6 hours PRN Moderate Pain (4 - 6)    Allergies    Azithromycin 5 Day Dose Pack (Unknown)    Intolerances      Social: no smoking, no alcohol, no illegal drugs; no recent travel, no exposure to TB  FAMILY HISTORY:    no history of premature cardiovascular disease in first degree relatives    ROS: the patient denies fever, no chills, no HA, no seizures, no dizziness, no sore throat, no nasal congestion, no blurry vision, no CP, no palpitations, no SOB, no cough, no abdominal pain, no diarrhea, no N/V, no dysuria, no leg pain, no claudication, no rash, no joint aches, no rectal pain or bleeding, no night sweats; has right cheek redness and pain  All other systems reviewed and are negative    Vital Signs Last 24 Hrs  T(C): 36.8 (13 Nov 2019 11:02), Max: 37.1 (12 Nov 2019 22:08)  T(F): 98.2 (13 Nov 2019 11:02), Max: 98.8 (12 Nov 2019 22:08)  HR: 104 (13 Nov 2019 11:02) (88 - 104)  BP: 109/62 (13 Nov 2019 11:02) (106/58 - 117/66)  BP(mean): --  RR: 17 (13 Nov 2019 11:02) (17 - 18)  SpO2: 99% (13 Nov 2019 11:02) (99% - 100%)  Daily Height in cm: 152.4 (12 Nov 2019 18:14)    Daily     PE:    Constitutional: frail looking  HEENT: NC/AT, EOMI, PERRLA, conjunctivae clear; ears and nose atraumatic; pharynx benign  Right face erythema, edema and pain  Neck: supple; thyroid not palpable  Back: no tenderness  Respiratory: respiratory effort normal; clear to auscultation  Cardiovascular: S1S2 regular, no murmurs  Abdomen: soft, not tender, not distended, positive BS; no liver or spleen organomegaly  Genitourinary: no suprapubic tenderness  Lymphatic: no LN palpable  Musculoskeletal: no muscle tenderness, no joint swelling or tenderness  Extremities: no pedal edema  Neurological/ Psychiatric: AxOx3, judgement and insight normal; moving all extremities  Skin: no rashes; no palpable lesions    Labs: all available labs reviewed                        10.0   7.44  )-----------( 169      ( 13 Nov 2019 07:53 )             30.2     11-13    142  |  113<H>  |  6<L>  ----------------------------<  91  4.0   |  22  |  0.60    Ca    7.6<L>      13 Nov 2019 07:53  Phos  3.6     11-13  Mg     2.1     11-13    TPro  5.7<L>  /  Alb  2.8<L>  /  TBili  0.6  /  DBili  x   /  AST  10<L>  /  ALT  12  /  AlkPhos  35<L>  11-13     LIVER FUNCTIONS - ( 13 Nov 2019 07:53 )  Alb: 2.8 g/dL / Pro: 5.7 gm/dL / ALK PHOS: 35 U/L / ALT: 12 U/L / AST: 10 U/L / GGT: x             Culture - Blood (collected 11 Nov 2019 12:01)  Source: .Blood None  Preliminary Report (12 Nov 2019 16:01):    No growth to date.    Culture - Blood (collected 11 Nov 2019 12:01)  Source: .Blood None  Preliminary Report (12 Nov 2019 16:01):    No growth to date.    Radiology: all available radiological tests reviewed    < from: CT Maxillofacial w/ IV Cont (11.12.19 @ 21:39) >  Right facial cellulitis. No drainable abscess.    < end of copied text >      Advanced directives addressed: full resuscitation Patient is a 44y old  Female who presents with a chief complaint of R facial swelling / erythema    HPI:  45 y/o F with h/o C sections x 3 was admitted on 11/13 for R cheek erythema / edema. Patient states that 6 days prior to admission; she noticed a pimple and she popped it. Soon after area became erythematous and swollen and patient came to ED on 11/10 and was given antibiotics. However, it did not improve. Patient also saw a plastic surgeon who attempted to drain it, but the area continued to be swollen/erythematous. Patient was prescribed oral antibiotics, but they did not help. In ER she received vancomycin IV, ceftriaxone, clindamycin and cipro.  During the night she received ceftriaxone and vancomycin IV, then developed a scratchy throat; no SOB; no throat closure, no stridor; no rash.  A concern for an allergic reaction was raided; the patient received benadryl and later clinda and cipro.  She tolerated cephalexin and bactrim as outpatient.   She reported MRSA colonization in her household (child and )    PMH: as above  PSH: as above  Meds: per reconciliation sheet, noted below  MEDICATIONS  (STANDING):  ciprofloxacin   IVPB 400 milliGRAM(s) IV Intermittent every 12 hours  clindamycin IVPB 600 milliGRAM(s) IV Intermittent every 8 hours  sodium chloride 0.9%. 1000 milliLiter(s) (75 mL/Hr) IV Continuous <Continuous>    MEDICATIONS  (PRN):  ibuprofen  Tablet. 600 milliGRAM(s) Oral every 6 hours PRN Moderate Pain (4 - 6)    Allergies    Azithromycin 5 Day Dose Pack (Unknown)    Intolerances      Social: no smoking, no alcohol, no illegal drugs; no recent travel, no exposure to TB  FAMILY HISTORY:    no history of premature cardiovascular disease in first degree relatives    ROS: the patient denies fever, no chills, no HA, no seizures, no dizziness, no sore throat, no nasal congestion, no blurry vision, no CP, no palpitations, no SOB, no cough, no abdominal pain, no diarrhea, no N/V, no dysuria, no leg pain, no claudication, no rash, no joint aches, no rectal pain or bleeding, no night sweats; has right cheek redness and pain  All other systems reviewed and are negative    Vital Signs Last 24 Hrs  T(C): 36.8 (13 Nov 2019 11:02), Max: 37.1 (12 Nov 2019 22:08)  T(F): 98.2 (13 Nov 2019 11:02), Max: 98.8 (12 Nov 2019 22:08)  HR: 104 (13 Nov 2019 11:02) (88 - 104)  BP: 109/62 (13 Nov 2019 11:02) (106/58 - 117/66)  BP(mean): --  RR: 17 (13 Nov 2019 11:02) (17 - 18)  SpO2: 99% (13 Nov 2019 11:02) (99% - 100%)  Daily Height in cm: 152.4 (12 Nov 2019 18:14)    Daily     PE:    Constitutional: frail looking  HEENT: NC/AT, EOMI, PERRLA, conjunctivae clear; ears and nose atraumatic; pharynx benign  Right face erythema, edema and pain; no drainage  Neck: supple; thyroid not palpable  Back: no tenderness  Respiratory: respiratory effort normal; clear to auscultation  Cardiovascular: S1S2 regular, no murmurs  Abdomen: soft, not tender, not distended, positive BS; no liver or spleen organomegaly  Genitourinary: no suprapubic tenderness  Lymphatic: no LN palpable  Musculoskeletal: no muscle tenderness, no joint swelling or tenderness  Extremities: no pedal edema  Neurological/ Psychiatric: AxOx3, judgement and insight normal; moving all extremities  Skin: no rashes; no palpable lesions    Labs: all available labs reviewed                        10.0   7.44  )-----------( 169      ( 13 Nov 2019 07:53 )             30.2     11-13    142  |  113<H>  |  6<L>  ----------------------------<  91  4.0   |  22  |  0.60    Ca    7.6<L>      13 Nov 2019 07:53  Phos  3.6     11-13  Mg     2.1     11-13    TPro  5.7<L>  /  Alb  2.8<L>  /  TBili  0.6  /  DBili  x   /  AST  10<L>  /  ALT  12  /  AlkPhos  35<L>  11-13     LIVER FUNCTIONS - ( 13 Nov 2019 07:53 )  Alb: 2.8 g/dL / Pro: 5.7 gm/dL / ALK PHOS: 35 U/L / ALT: 12 U/L / AST: 10 U/L / GGT: x             Culture - Blood (collected 11 Nov 2019 12:01)  Source: .Blood None  Preliminary Report (12 Nov 2019 16:01):    No growth to date.    Culture - Blood (collected 11 Nov 2019 12:01)  Source: .Blood None  Preliminary Report (12 Nov 2019 16:01):    No growth to date.    Radiology: all available radiological tests reviewed    < from: CT Maxillofacial w/ IV Cont (11.12.19 @ 21:39) >  Right facial cellulitis. No drainable abscess.    < end of copied text >      Advanced directives addressed: full resuscitation

## 2019-11-13 NOTE — CONSULT NOTE ADULT - SUBJECTIVE AND OBJECTIVE BOX
44F seen by me in ER 2 days ago with significant hemifacial cellulitis and small abscess. Attempted aspiration at that time returned little fluid, and patient was sent home with instructions for warm compresses and continued antibiotic therapy.  She called my office complaining of increasing swelling and pain, so I advised her to return to the ED for CT for further evaluation.   CT demonstrated no drainable fluid collection, but she was admitted for IV abx.  Of note, she had a similar cellulitis/pimple behind her ear on the same side 2 weeks prior to this presentation,    PMH: none  PSH: 3 c/s  Allx: azythromycin (throat tingling)    Soc: nonsmoker    Exam  IN NAD  right hemiface cellulitis/edema significant;y improved, with induration and edema now confined largely to the central cheek and nasolabial fold  non-labored breathing  abd ND  No peripheral edema    Impression: 44F with right hemifacial cellulitis. Improved.  --cont warm compresses  --Abx per ID  --patient may follow up with me in 1 -2 weeks    Please call with any questions [phone]

## 2019-11-13 NOTE — PROGRESS NOTE ADULT - ASSESSMENT
43 y/o F with no significant PMH, p/w R cheek erythema / edema    *Sepsis 2/2 facial cellulitis  -Lactate WNL  -CT does not report abscess   -Patient received ceftriaxone IVP and then was started on vancomycin IV, at which point patient c/o not being able to clear mucus from throat and felt her voice getting raspy. Denies any rash / itchiness. Vancomycin had been stopped. Uncertain which medication caused the reaction  -Will give Clindamycin + Ciprofloxacin   -ID consult   -Pain control   -IVF      *DVT ppx  -SCDs 43 y/o F with no significant PMH, p/w R cheek erythema / edema    *Sepsis 2/2 facial cellulitis  Possible infection with MRSA.  -she did not improve of oral bactim, then cephalexin  -"scratchy throat" after infusions on ceftriaxone and vancomycin IV ?allergic reaction vs infusion reaction due to vancomycin  -Lactate WNL  -CT does not report abscess   -restart vancomycin 1 gm IV q12h (infuse slowly) and ceftriaxone 2 gm IV qd   -ID consult appreciated   -Pain control   -IVF      *DVT ppx  -SCDs

## 2019-11-13 NOTE — PROGRESS NOTE ADULT - SUBJECTIVE AND OBJECTIVE BOX
HOSPITALIST PROGRESS NOTE:  SUBJECTIVE:  PCP: Dr. Zbigniew Richardson    Chief Complaint: Patient is a 44y old  Female who presents with a chief complaint of R facial swelling / erythema (13 Nov 2019 01:00)      HPI:  43 y/o F with no significant PMH, p/w R cheek erythema / edema. Patient states that on Wednesday she noticed a pimple, she popped it. Soon after area became erythematous and swollen and patient came to ED and was given antibiotics. However, it did not improve. Patient also had plastic surgeon attempt to drain it in ED, but area continue to become swollen/erythematous. Patient was prescribed oral antibiotics, but they did not help. Denies nausea, vomiting, fever, chills, cough, runny nose, sore throat, nausea, vomiting, abdominal pain, CP, SOB.    11/13:  Above reviewed      Allergies:  Azithromycin 5 Day Dose Pack (Unknown)    REVIEW OF SYSTEMS:  See HPI. All other review of systems is negative unless indicated above.     OBJECTIVE  Physical Exam:  Vital Signs:  Height (cm): 152.4 (11-12 @ 18:14)  Weight (kg): 49 (11-12 @ 18:14)  BMI (kg/m2): 21.1 (11-12 @ 18:14)  BSA (m2): 1.44 (11-12 @ 18:14)  Vital Signs Last 24 Hrs  T(C): 36.9 (13 Nov 2019 01:41), Max: 37.1 (12 Nov 2019 22:08)  T(F): 98.4 (13 Nov 2019 01:41), Max: 98.8 (12 Nov 2019 22:08)  HR: 90 (13 Nov 2019 01:41) (88 - 96)  BP: 117/66 (13 Nov 2019 01:41) (106/58 - 117/66)  BP(mean): --  RR: 18 (13 Nov 2019 01:41) (17 - 18)  SpO2: 100% (13 Nov 2019 01:41) (100% - 100%)  I&O's Summary      Constitutional: NAD, awake and alert, well-developed  Neurological: AAO x 3, no focal deficits  HEENT: PERRLA, EOMI, MMM; R facial swelling / erythema / warmth  Neck: Soft and supple, No LAD, No JVD  Respiratory: Breath sounds are clear bilaterally, No wheezing, rales or rhonchi  Cardiovascular: S1 and S2, regular rate and rhythm; no Murmurs, gallops or rubs  Gastrointestinal: Bowel Sounds present, soft, nontender, nondistended, no guarding, no rebound tenderness  Back: No CVA tenderness   Extremities: No peripheral edema  Vascular: 2+ peripheral pulses  Musculoskeletal: 5/5 strength b/l upper and lower extremities  Skin: No rashes  Breast: Deferred  Rectal: Deferred    MEDICATIONS  (STANDING):  ciprofloxacin   IVPB 400 milliGRAM(s) IV Intermittent every 12 hours  clindamycin IVPB 600 milliGRAM(s) IV Intermittent every 8 hours  sodium chloride 0.9%. 1000 milliLiter(s) (75 mL/Hr) IV Continuous <Continuous>      LABS: All Labs Reviewed:                        10.0   7.44  )-----------( 169      ( 13 Nov 2019 07:53 )             30.2     11-13    142  |  113<H>  |  6<L>  ----------------------------<  91  4.0   |  22  |  0.60    Ca    7.6<L>      13 Nov 2019 07:53  Phos  3.6     11-13  Mg     2.1     11-13    TPro  5.7<L>  /  Alb  2.8<L>  /  TBili  0.6  /  DBili  x   /  AST  10<L>  /  ALT  12  /  AlkPhos  35<L>  11-13    PT/INR - ( 13 Nov 2019 07:53 )   PT: 12.5 sec;   INR: 1.12 ratio         Blood Culture:   11-11 @ 12:01  Organism --  Gram Stain Blood -- Gram Stain --  Specimen Source .Blood None  Culture-Blood --        RADIOLOGY/EKG:    < from: CT Maxillofacial w/ IV Cont (11.12.19 @ 21:39) >    IMPRESSION:    Right facial cellulitis. No drainable abscess.    < end of copied text > HOSPITALIST PROGRESS NOTE:  SUBJECTIVE:  PCP: Dr. Zbigniew Richardson    Chief Complaint: Patient is a 44y old  Female who presents with a chief complaint of R facial swelling / erythema (13 Nov 2019 01:00)      HPI:  45 y/o F with no significant PMH, p/w R cheek erythema / edema. Patient states that on Wednesday she noticed a pimple, she popped it. Soon after area became erythematous and swollen and patient came to ED and was given antibiotics. However, it did not improve. Patient also had plastic surgeon attempt to drain it in ED, but area continue to become swollen/erythematous. Patient was prescribed oral antibiotics, but they did not help. Denies nausea, vomiting, fever, chills, cough, runny nose, sore throat, nausea, vomiting, abdominal pain, CP, SOB.    11/13:  Above reviewed. Patient has no complaints.  swelling and redness coming down    Allergies:  Azithromycin 5 Day Dose Pack (Unknown)    REVIEW OF SYSTEMS:  See HPI. All other review of systems is negative unless indicated above.     OBJECTIVE  Physical Exam:  Vital Signs Last 24 Hrs  T(C): 36.8 (13 Nov 2019 11:02), Max: 37.1 (12 Nov 2019 22:08)  T(F): 98.2 (13 Nov 2019 11:02), Max: 98.8 (12 Nov 2019 22:08)  HR: 104 (13 Nov 2019 11:02) (88 - 104)  BP: 109/62 (13 Nov 2019 11:02) (106/58 - 117/66)  BP(mean): --  RR: 17 (13 Nov 2019 11:02) (17 - 18)  SpO2: 99% (13 Nov 2019 11:02) (99% - 100%)      Constitutional: NAD, awake and alert, well-developed  Neurological: AAO x 3, no focal deficits  HEENT: PERRLA, EOMI, MMM; R facial swelling / erythema / warmth  Neck: Soft and supple, No LAD, No JVD  Respiratory: Breath sounds are clear bilaterally, No wheezing, rales or rhonchi  Cardiovascular: S1 and S2, regular rate and rhythm; no Murmurs, gallops or rubs  Gastrointestinal: Bowel Sounds present, soft, nontender, nondistended, no guarding, no rebound tenderness  Back: No CVA tenderness   Extremities: No peripheral edema  Vascular: 2+ peripheral pulses  Musculoskeletal: 5/5 strength b/l upper and lower extremities  Skin: No rashes  Breast: Deferred  Rectal: Deferred    MEDICATIONS  (STANDING):  ciprofloxacin   IVPB 400 milliGRAM(s) IV Intermittent every 12 hours  clindamycin IVPB 600 milliGRAM(s) IV Intermittent every 8 hours  sodium chloride 0.9%. 1000 milliLiter(s) (75 mL/Hr) IV Continuous <Continuous>      LABS: All Labs Reviewed:                        10.0   7.44  )-----------( 169      ( 13 Nov 2019 07:53 )             30.2     11-13    142  |  113<H>  |  6<L>  ----------------------------<  91  4.0   |  22  |  0.60    Ca    7.6<L>      13 Nov 2019 07:53  Phos  3.6     11-13  Mg     2.1     11-13    TPro  5.7<L>  /  Alb  2.8<L>  /  TBili  0.6  /  DBili  x   /  AST  10<L>  /  ALT  12  /  AlkPhos  35<L>  11-13    PT/INR - ( 13 Nov 2019 07:53 )   PT: 12.5 sec;   INR: 1.12 ratio         Blood Culture:   11-11 @ 12:01  Organism --  Gram Stain Blood -- Gram Stain --  Specimen Source .Blood None  Culture-Blood --        RADIOLOGY/EKG:    < from: CT Maxillofacial w/ IV Cont (11.12.19 @ 21:39) >    IMPRESSION:    Right facial cellulitis. No drainable abscess.    < end of copied text >

## 2019-11-13 NOTE — H&P ADULT - ASSESSMENT
45 y/o F with no significant PMH, p/w R cheek erythema / edema    *Sepsis 2/2 facial cellulitis  -Patient received ceftriaxone IVP and then was started on vancomycin IV, at which point patient c/o not being able to clear mucus from throat and felt her voice getting raspy. Denies any rash / itchiness. Vancomycin had been stopped.   -Will give Daptomycin + unasyn  -ID consult   -Pain control   -IVF  -Lactate WNL  -CT does not report abscess     *DVT ppx  -SCDs 45 y/o F with no significant PMH, p/w R cheek erythema / edema    *Sepsis 2/2 facial cellulitis  -Patient received ceftriaxone IVP and then was started on vancomycin IV, at which point patient c/o not being able to clear mucus from throat and felt her voice getting raspy. Denies any rash / itchiness. Vancomycin had been stopped.   -Will give Clindamycin + Ciprofloxacin   -ID consult   -Pain control   -IVF  -Lactate WNL  -CT does not report abscess     *DVT ppx  -SCDs 43 y/o F with no significant PMH, p/w R cheek erythema / edema    *Sepsis 2/2 facial cellulitis  -Patient received ceftriaxone IVP and then was started on vancomycin IV, at which point patient c/o not being able to clear mucus from throat and felt her voice getting raspy. Denies any rash / itchiness. Vancomycin had been stopped. Uncertain which medication caused the reaction  -Will give Clindamycin + Ciprofloxacin   -ID consult   -Pain control   -IVF  -Lactate WNL  -CT does not report abscess     *DVT ppx  -SCDs

## 2019-11-14 RX ORDER — ALPRAZOLAM 0.25 MG
0.5 TABLET ORAL AT BEDTIME
Refills: 0 | Status: DISCONTINUED | OUTPATIENT
Start: 2019-11-14 | End: 2019-11-17

## 2019-11-14 RX ADMIN — Medication 600 MILLIGRAM(S): at 01:52

## 2019-11-14 RX ADMIN — Medication 1 TABLET(S): at 23:09

## 2019-11-14 RX ADMIN — Medication 600 MILLIGRAM(S): at 01:18

## 2019-11-14 RX ADMIN — Medication 1 TABLET(S): at 12:01

## 2019-11-14 RX ADMIN — Medication 600 MILLIGRAM(S): at 09:11

## 2019-11-14 RX ADMIN — Medication 600 MILLIGRAM(S): at 18:05

## 2019-11-14 RX ADMIN — Medication 250 MILLIGRAM(S): at 01:11

## 2019-11-14 RX ADMIN — Medication 600 MILLIGRAM(S): at 18:43

## 2019-11-14 RX ADMIN — Medication 250 MILLIGRAM(S): at 12:43

## 2019-11-14 RX ADMIN — CEFTRIAXONE 2000 MILLIGRAM(S): 500 INJECTION, POWDER, FOR SOLUTION INTRAMUSCULAR; INTRAVENOUS at 12:03

## 2019-11-14 RX ADMIN — Medication 0.5 MILLIGRAM(S): at 23:09

## 2019-11-14 RX ADMIN — Medication 600 MILLIGRAM(S): at 11:06

## 2019-11-14 NOTE — PROGRESS NOTE ADULT - ASSESSMENT
43 y/o F with h/o C sections x 3 was admitted on 11/13 for R cheek erythema / edema. Patient states that 6 days prior to admission; she noticed a pimple and she popped it. Soon after area became erythematous and swollen and patient came to ED on 11/10 and was given antibiotics. However, it did not improve. Patient also saw a plastic surgeon who attempted to drain it, but the area continued to be swollen/erythematous. Patient was prescribed oral antibiotics, but they did not help. In ER she received vancomycin IV, ceftriaxone, clindamycin and cipro.    1. Right face cellulitis. Possible infection with MRSA.  -her face is improving  -she did not improve of oral bactim, then cephalexin  -on vancomycin 1 gm IV q12h and ceftriaxone 2 gm IV qd # 2  -tolerating abx well so far; no side effects noted  -obtain vancomycin trough level  -monitor closely in mari of recent history  -f/u cultures  -continue abx coverage   -monitor temps  -f/u CBC  -supportive care  2. Other issues:   -care per medicine

## 2019-11-14 NOTE — PROGRESS NOTE ADULT - SUBJECTIVE AND OBJECTIVE BOX
HOSPITALIST PROGRESS NOTE:  SUBJECTIVE:  PCP: Dr. Zbigniew Richardson    Chief Complaint: Patient is a 44y old  Female who presents with a chief complaint of R facial swelling / erythema (13 Nov 2019 01:00)      HPI:  43 y/o F with no significant PMH, p/w R cheek erythema / edema. Patient states that on Wednesday she noticed a pimple, she popped it. Soon after area became erythematous and swollen and patient came to ED and was given antibiotics. However, it did not improve. Patient also had plastic surgeon attempt to drain it in ED, but area continue to become swollen/erythematous. Patient was prescribed oral antibiotics, but they did not help. Denies nausea, vomiting, fever, chills, cough, runny nose, sore throat, nausea, vomiting, abdominal pain, CP, SOB.    11/13:  Above reviewed. Patient has no complaints.  swelling and redness coming down  11/14:  redness still there with some pus drainage that started yesterday; Patient has no other complaints     Allergies:  Azithromycin 5 Day Dose Pack (Unknown)    REVIEW OF SYSTEMS:  See HPI. All other review of systems is negative unless indicated above.     OBJECTIVE  Physical Exam:  Vital Signs Last 24 Hrs  T(C): 36.8 (14 Nov 2019 11:26), Max: 37 (13 Nov 2019 16:30)  T(F): 98.2 (14 Nov 2019 11:26), Max: 98.6 (13 Nov 2019 16:30)  HR: 87 (14 Nov 2019 11:26) (73 - 87)  BP: 117/78 (14 Nov 2019 11:26) (95/54 - 117/78)  BP(mean): --  RR: 18 (14 Nov 2019 11:26) (16 - 18)  SpO2: 95% (14 Nov 2019 11:26) (95% - 100%)    Constitutional: NAD, awake and alert, well-developed  Neurological: AAO x 3, no focal deficits  HEENT: PERRLA, EOMI, MMM; R facial swelling / erythema / warmth  Neck: Soft and supple, No LAD, No JVD  Respiratory: Breath sounds are clear bilaterally, No wheezing, rales or rhonchi  Cardiovascular: S1 and S2, regular rate and rhythm; no Murmurs, gallops or rubs  Gastrointestinal: Bowel Sounds present, soft, nontender, nondistended, no guarding, no rebound tenderness  Back: No CVA tenderness   Extremities: No peripheral edema  Vascular: 2+ peripheral pulses  Musculoskeletal: 5/5 strength b/l upper and lower extremities  Skin: No rashes  Breast: Deferred  Rectal: Deferred    MEDICATIONS  (STANDING):  ciprofloxacin   IVPB 400 milliGRAM(s) IV Intermittent every 12 hours  clindamycin IVPB 600 milliGRAM(s) IV Intermittent every 8 hours  sodium chloride 0.9%. 1000 milliLiter(s) (75 mL/Hr) IV Continuous <Continuous>    Lab Results:  CBC  CBC Full  -  ( 13 Nov 2019 07:53 )  WBC Count : 7.44 K/uL  RBC Count : 3.35 M/uL  Hemoglobin : 10.0 g/dL  Hematocrit : 30.2 %  Platelet Count - Automated : 169 K/uL  Mean Cell Volume : 90.1 fl  Mean Cell Hemoglobin : 29.9 pg  Mean Cell Hemoglobin Concentration : 33.1 gm/dL  Auto Neutrophil # : 4.95 K/uL  Auto Lymphocyte # : 1.71 K/uL  Auto Monocyte # : 0.57 K/uL  Auto Eosinophil # : 0.16 K/uL  Auto Basophil # : 0.03 K/uL  Auto Neutrophil % : 66.4 %  Auto Lymphocyte % : 23.0 %  Auto Monocyte % : 7.7 %  Auto Eosinophil % : 2.2 %  Auto Basophil % : 0.4 %    .		Differential:	[] Automated		[] Manual  Chemistry                        10.0   7.44  )-----------( 169      ( 13 Nov 2019 07:53 )             30.2     11-13    142  |  113<H>  |  6<L>  ----------------------------<  91  4.0   |  22  |  0.60    Ca    7.6<L>      13 Nov 2019 07:53  Phos  3.6     11-13  Mg     2.1     11-13    TPro  5.7<L>  /  Alb  2.8<L>  /  TBili  0.6  /  DBili  x   /  AST  10<L>  /  ALT  12  /  AlkPhos  35<L>  11-13    LIVER FUNCTIONS - ( 13 Nov 2019 07:53 )  Alb: 2.8 g/dL / Pro: 5.7 gm/dL / ALK PHOS: 35 U/L / ALT: 12 U/L / AST: 10 U/L / GGT: x           PT/INR - ( 13 Nov 2019 07:53 )   PT: 12.5 sec;   INR: 1.12 ratio         MICROBIOLOGY/CULTURES:  Culture Results:   No growth to date. (11-12 @ 19:37)  Culture Results:   No growth to date. (11-12 @ 19:37)  Culture Results:   No growth to date. (11-11 @ 12:01)  Culture Results:   No growth to date. (11-11 @ 12:01)          RADIOLOGY/EKG:    < from: CT Maxillofacial w/ IV Cont (11.12.19 @ 21:39) >    IMPRESSION:    Right facial cellulitis. No drainable abscess.    < end of copied text >

## 2019-11-14 NOTE — PROGRESS NOTE ADULT - ASSESSMENT
45 y/o F with no significant PMH, p/w R cheek erythema / edema    *Sepsis 2/2 facial cellulitis  Possible infection with MRSA.  -she did not improve of oral bactim, then cephalexin  -"scratchy throat" after infusions on ceftriaxone and vancomycin IV ?allergic reaction vs infusion reaction due to vancomycin  -Lactate WNL  -CT does not report abscess   -restart vancomycin 1 gm IV q12h (infuse slowly) and ceftriaxone 2 gm IV qd   -ID consult appreciated   -Pain control       *DVT ppx  -SCDs

## 2019-11-14 NOTE — PROGRESS NOTE ADULT - SUBJECTIVE AND OBJECTIVE BOX
Telephone Encounter by Selma King MA at 02/16/18 09:08 AM     Author:  Selma King MA Service:  (none) Author Type:  Medical Assistant     Filed:  02/16/18 09:08 AM Encounter Date:  2/15/2018 Status:  Signed     :  Selma King MA (Medical Assistant)            routed to PT[DG1.1M]      Revision History        User Key Date/Time User Provider Type Action    > DG1.1 02/16/18 09:08 AM Selma King MA Medical Assistant Sign    M - Manual             Date of service: 11-14-19 @ 10:10    Lying in bed in NAD  TOlerated abx yesterday  Right face feels less swollen  Scant serosanguinous discharge overnight - sent for c/s    ROS: no fever or chills; denies dizziness, no HA, no SOB or cough, no abdominal pain, no diarrhea or constipation; no dysuria, no legs pain    MEDICATIONS  (STANDING):  cefTRIAXone Injectable. 2000 milliGRAM(s) IV Push every 24 hours  lactobacillus acidophilus 1 Tablet(s) Oral two times a day  vancomycin  IVPB 1000 milliGRAM(s) IV Intermittent every 12 hours      Vital Signs Last 24 Hrs  T(C): 36.6 (14 Nov 2019 04:38), Max: 37 (13 Nov 2019 16:30)  T(F): 97.8 (14 Nov 2019 04:38), Max: 98.6 (13 Nov 2019 16:30)  HR: 73 (14 Nov 2019 04:38) (73 - 104)  BP: 101/55 (14 Nov 2019 04:38) (95/54 - 109/62)  BP(mean): --  RR: 16 (14 Nov 2019 04:38) (16 - 18)  SpO2: 98% (14 Nov 2019 04:38) (95% - 100%)    Physical Exam:      Constitutional: frail looking  HEENT: NC/AT, EOMI, PERRLA, conjunctivae clear  Right face erythema, edema and pain; scant drainage - improving  Neck: supple; thyroid not palpable  Back: no tenderness  Respiratory: respiratory effort normal; clear to auscultation  Cardiovascular: S1S2 regular, no murmurs  Abdomen: soft, not tender, not distended, positive BS  Genitourinary: no suprapubic tenderness  Lymphatic: no LN palpable  Musculoskeletal: no muscle tenderness, no joint swelling or tenderness  Extremities: no pedal edema  Neurological/ Psychiatric: AxOx3, moving all extremities  Skin: no rashes; no palpable lesions    Labs: all available labs reviewed                        10.0   7.44  )-----------( 169      ( 13 Nov 2019 07:53 )             30.2     11-13    142  |  113<H>  |  6<L>  ----------------------------<  91  4.0   |  22  |  0.60    Ca    7.6<L>      13 Nov 2019 07:53  Phos  3.6     11-13  Mg     2.1     11-13    TPro  5.7<L>  /  Alb  2.8<L>  /  TBili  0.6  /  DBili  x   /  AST  10<L>  /  ALT  12  /  AlkPhos  35<L>  11-13     LIVER FUNCTIONS - ( 13 Nov 2019 07:53 )  Alb: 2.8 g/dL / Pro: 5.7 gm/dL / ALK PHOS: 35 U/L / ALT: 12 U/L / AST: 10 U/L / GGT: x             Culture - Blood (collected 11 Nov 2019 12:01)  Source: .Blood None  Preliminary Report (12 Nov 2019 16:01):    No growth to date.    Culture - Blood (collected 11 Nov 2019 12:01)  Source: .Blood None  Preliminary Report (12 Nov 2019 16:01):    No growth to date.    Radiology: all available radiological tests reviewed    < from: CT Maxillofacial w/ IV Cont (11.12.19 @ 21:39) >  Right facial cellulitis. No drainable abscess.    < end of copied text >      Advanced directives addressed: full resuscitation

## 2019-11-15 LAB
HCT VFR BLD CALC: 34.7 % — SIGNIFICANT CHANGE UP (ref 34.5–45)
HGB BLD-MCNC: 11.4 G/DL — LOW (ref 11.5–15.5)
MCHC RBC-ENTMCNC: 29.1 PG — SIGNIFICANT CHANGE UP (ref 27–34)
MCHC RBC-ENTMCNC: 32.9 GM/DL — SIGNIFICANT CHANGE UP (ref 32–36)
MCV RBC AUTO: 88.5 FL — SIGNIFICANT CHANGE UP (ref 80–100)
PLATELET # BLD AUTO: 242 K/UL — SIGNIFICANT CHANGE UP (ref 150–400)
RBC # BLD: 3.92 M/UL — SIGNIFICANT CHANGE UP (ref 3.8–5.2)
RBC # FLD: 13.1 % — SIGNIFICANT CHANGE UP (ref 10.3–14.5)
VANCOMYCIN TROUGH SERPL-MCNC: 21.7 UG/ML — HIGH (ref 10–20)
WBC # BLD: 7.1 K/UL — SIGNIFICANT CHANGE UP (ref 3.8–10.5)
WBC # FLD AUTO: 7.1 K/UL — SIGNIFICANT CHANGE UP (ref 3.8–10.5)

## 2019-11-15 RX ORDER — LACTOBACILLUS ACIDOPHILUS 100MM CELL
1 CAPSULE ORAL THREE TIMES A DAY
Refills: 0 | Status: DISCONTINUED | OUTPATIENT
Start: 2019-11-15 | End: 2019-11-17

## 2019-11-15 RX ADMIN — Medication 600 MILLIGRAM(S): at 17:30

## 2019-11-15 RX ADMIN — Medication 1 TABLET(S): at 12:07

## 2019-11-15 RX ADMIN — CEFTRIAXONE 2000 MILLIGRAM(S): 500 INJECTION, POWDER, FOR SOLUTION INTRAMUSCULAR; INTRAVENOUS at 11:08

## 2019-11-15 RX ADMIN — Medication 600 MILLIGRAM(S): at 10:09

## 2019-11-15 RX ADMIN — Medication 600 MILLIGRAM(S): at 09:09

## 2019-11-15 RX ADMIN — Medication 250 MILLIGRAM(S): at 12:07

## 2019-11-15 RX ADMIN — Medication 0.5 MILLIGRAM(S): at 22:33

## 2019-11-15 RX ADMIN — Medication 1 TABLET(S): at 22:33

## 2019-11-15 RX ADMIN — Medication 30 MILLILITER(S): at 23:48

## 2019-11-15 RX ADMIN — Medication 250 MILLIGRAM(S): at 00:08

## 2019-11-15 RX ADMIN — Medication 250 MILLIGRAM(S): at 23:36

## 2019-11-15 RX ADMIN — Medication 600 MILLIGRAM(S): at 18:02

## 2019-11-15 NOTE — PROGRESS NOTE ADULT - ASSESSMENT
43 y/o F with no significant PMH, p/w R cheek erythema / edema    *Sepsis 2/2 facial cellulitis  Possible infection with MRSA.  -she did not improve of oral bactim, then cephalexin  -"scratchy throat" after infusions on ceftriaxone and vancomycin IV ?allergic reaction vs infusion reaction due to vancomycin  -Lactate WNL  -CT does not report abscess   -Wound Cx moderate staph aureus  -restart vancomycin 1 gm IV q12h (infuse slowly) and ceftriaxone 2 gm IV qd #3  -ID consult appreciated   -Pain control       *DVT ppx  -SCDs

## 2019-11-15 NOTE — PROGRESS NOTE ADULT - ASSESSMENT
43 y/o F with h/o C sections x 3 was admitted on 11/13 for R cheek erythema / edema. Patient states that 6 days prior to admission; she noticed a pimple and she popped it. Soon after area became erythematous and swollen and patient came to ED on 11/10 and was given antibiotics. However, it did not improve. Patient also saw a plastic surgeon who attempted to drain it, but the area continued to be swollen/erythematous. Patient was prescribed oral antibiotics, but they did not help. In ER she received vancomycin IV, ceftriaxone, clindamycin and cipro.    1. Right face cellulitis. Possible infection with MRSA.  -her face is improving; but   -had an episode of loose stool last night, but then had a formed BM this AM  -doubt CDAD  -she did not improve of oral bactim, then cephalexin  -on vancomycin 1 gm IV q12h and ceftriaxone 2 gm IV qd # 3  -tolerating abx well so far; no side effects noted  -vancomycin trough level is slightly high, but specimen collected at 5AM and vancomycin infused only at 11AM;   -keep current vancomycin dose  -monitor closely in mari of recent history  -f/u cultures  -continue IV abx coverage   -monitor temps  -f/u CBC  -supportive care  2. Other issues:   -care per medicine

## 2019-11-15 NOTE — PROVIDER CONTACT NOTE (OTHER) - SITUATION
DR. FRANCISCO J LOPEZ, SPOKE WITH JJ FROM MD'S OFFICE. WILL INFORM PCP, PATIENT IS ADMITTED TO . WAS MADE AWARE DR. BLACKWOOD IS THE COVERING HOSPITALIST.
Pt reported having 3 liquid BMs 11/13 and 2 liquid BMs 11/14

## 2019-11-15 NOTE — PROGRESS NOTE ADULT - SUBJECTIVE AND OBJECTIVE BOX
Date of service: 11-15-19 @ 10:08    Reported with vancomycin level high  Has new diarrhea overnight  Lying in bed in NAD  Weak looking  Right face swelling is improving  No further discharge    ROS: no fever or chills; denies dizziness, no HA, no SOB or cough, no abdominal pain, no diarrhea or constipation; no dysuria, no legs pain    MEDICATIONS  (STANDING):  cefTRIAXone Injectable. 2000 milliGRAM(s) IV Push every 24 hours  lactobacillus acidophilus 1 Tablet(s) Oral two times a day  vancomycin  IVPB 1000 milliGRAM(s) IV Intermittent every 12 hours      Vital Signs Last 24 Hrs  T(C): 36.8 (15 Nov 2019 05:29), Max: 36.8 (14 Nov 2019 11:26)  T(F): 98.2 (15 Nov 2019 05:29), Max: 98.3 (14 Nov 2019 23:08)  HR: 87 (15 Nov 2019 07:08) (74 - 87)  BP: 94/56 (15 Nov 2019 07:08) (88/40 - 117/78)  BP(mean): --  RR: 16 (15 Nov 2019 05:29) (16 - 18)  SpO2: 100% (15 Nov 2019 05:29) (95% - 100%)    Physical Exam:      Constitutional: frail looking  HEENT: NC/AT, EOMI, PERRLA, conjunctivae clear  Right face erythema, edema and pain; no drainage - improving  Neck: supple; thyroid not palpable  Back: no tenderness  Respiratory: respiratory effort normal; clear to auscultation  Cardiovascular: S1S2 regular, no murmurs  Abdomen: soft, not tender, not distended, positive BS  Genitourinary: no suprapubic tenderness  Lymphatic: no LN palpable  Musculoskeletal: no muscle tenderness, no joint swelling or tenderness  Extremities: no pedal edema  Neurological/ Psychiatric: AxOx3, moving all extremities  Skin: no rashes; no palpable lesions    Labs: reviewed                        11.4   7.10  )-----------( 242      ( 15 Nov 2019 05:38 )             34.7     Vancomycin Level, Trough: 21.7 ug/mL (11-15 @ 05:38)                        10.0   7.44  )-----------( 169      ( 13 Nov 2019 07:53 )             30.2     11-13    142  |  113<H>  |  6<L>  ----------------------------<  91  4.0   |  22  |  0.60    Ca    7.6<L>      13 Nov 2019 07:53  Phos  3.6     11-13  Mg     2.1     11-13    TPro  5.7<L>  /  Alb  2.8<L>  /  TBili  0.6  /  DBili  x   /  AST  10<L>  /  ALT  12  /  AlkPhos  35<L>  11-13     LIVER FUNCTIONS - ( 13 Nov 2019 07:53 )  Alb: 2.8 g/dL / Pro: 5.7 gm/dL / ALK PHOS: 35 U/L / ALT: 12 U/L / AST: 10 U/L / GGT: x             Culture - Blood (collected 11 Nov 2019 12:01)  Source: .Blood None  Preliminary Report (12 Nov 2019 16:01):    No growth to date.    Culture - Blood (collected 11 Nov 2019 12:01)  Source: .Blood None  Preliminary Report (12 Nov 2019 16:01):    No growth to date.    Radiology: all available radiological tests reviewed    < from: CT Maxillofacial w/ IV Cont (11.12.19 @ 21:39) >  Right facial cellulitis. No drainable abscess.    < end of copied text >      Advanced directives addressed: full resuscitation

## 2019-11-15 NOTE — PROVIDER CONTACT NOTE (OTHER) - ACTION/TREATMENT ORDERED:
MD Pleitez made aware, md ordered patient to be on contact isolation to r/o c. diff, will collect c. diff sample when patient has BM, will continue to monitor

## 2019-11-15 NOTE — PROGRESS NOTE ADULT - SUBJECTIVE AND OBJECTIVE BOX
HOSPITALIST PROGRESS NOTE:  SUBJECTIVE:  PCP: Dr. Zbigniew Richardson    Chief Complaint: Patient is a 44y old  Female who presents with a chief complaint of R facial swelling / erythema (13 Nov 2019 01:00)      HPI:  43 y/o F with no significant PMH, p/w R cheek erythema / edema. Patient states that on Wednesday she noticed a pimple, she popped it. Soon after area became erythematous and swollen and patient came to ED and was given antibiotics. However, it did not improve. Patient also had plastic surgeon attempt to drain it in ED, but area continue to become swollen/erythematous. Patient was prescribed oral antibiotics, but they did not help. Denies nausea, vomiting, fever, chills, cough, runny nose, sore throat, nausea, vomiting, abdominal pain, CP, SOB.    11/13:  Above reviewed. Patient has no complaints.  swelling and redness coming down  11/14:  redness still there with some pus drainage that started yesterday; Patient has no other complaints   11/14:  Overnight patient had diarrhea and was R/O for cdiff;  patient has not had any further diarrhea and cdiff was never sent;      Allergies:  Azithromycin 5 Day Dose Pack (Unknown)    REVIEW OF SYSTEMS:  See HPI. All other review of systems is negative unless indicated above.     OBJECTIVE  Physical Exam:  Vital Signs Last 24 Hrs  T(C): 36.6 (15 Nov 2019 12:07), Max: 36.8 (14 Nov 2019 23:08)  T(F): 97.9 (15 Nov 2019 12:07), Max: 98.3 (14 Nov 2019 23:08)  HR: 91 (15 Nov 2019 12:07) (74 - 91)  BP: 106/69 (15 Nov 2019 12:07) (88/40 - 114/64)  BP(mean): --  RR: 16 (15 Nov 2019 12:07) (16 - 18)  SpO2: 100% (15 Nov 2019 12:07) (99% - 100%)    Constitutional: NAD, awake and alert, well-developed  Neurological: AAO x 3, no focal deficits  HEENT: PERRLA, EOMI, MMM; R facial swelling / erythema / warmth  Neck: Soft and supple, No LAD, No JVD  Respiratory: Breath sounds are clear bilaterally, No wheezing, rales or rhonchi  Cardiovascular: S1 and S2, regular rate and rhythm; no Murmurs, gallops or rubs  Gastrointestinal: Bowel Sounds present, soft, nontender, nondistended, no guarding, no rebound tenderness  Back: No CVA tenderness   Extremities: No peripheral edema  Vascular: 2+ peripheral pulses  Musculoskeletal: 5/5 strength b/l upper and lower extremities  Skin: No rashes  Breast: Deferred  Rectal: Deferred    MEDICATIONS  (STANDING):  ciprofloxacin   IVPB 400 milliGRAM(s) IV Intermittent every 12 hours  clindamycin IVPB 600 milliGRAM(s) IV Intermittent every 8 hours  sodium chloride 0.9%. 1000 milliLiter(s) (75 mL/Hr) IV Continuous <Continuous>    Lab Results:  CBC  CBC Full  -  ( 13 Nov 2019 07:53 )  WBC Count : 7.44 K/uL  RBC Count : 3.35 M/uL  Hemoglobin : 10.0 g/dL  Hematocrit : 30.2 %  Platelet Count - Automated : 169 K/uL  Mean Cell Volume : 90.1 fl  Mean Cell Hemoglobin : 29.9 pg  Mean Cell Hemoglobin Concentration : 33.1 gm/dL  Auto Neutrophil # : 4.95 K/uL  Auto Lymphocyte # : 1.71 K/uL  Auto Monocyte # : 0.57 K/uL  Auto Eosinophil # : 0.16 K/uL  Auto Basophil # : 0.03 K/uL  Auto Neutrophil % : 66.4 %  Auto Lymphocyte % : 23.0 %  Auto Monocyte % : 7.7 %  Auto Eosinophil % : 2.2 %  Auto Basophil % : 0.4 %    .		Differential:	[] Automated		[] Manual  Chemistry                        10.0   7.44  )-----------( 169      ( 13 Nov 2019 07:53 )             30.2     11-13    142  |  113<H>  |  6<L>  ----------------------------<  91  4.0   |  22  |  0.60    Ca    7.6<L>      13 Nov 2019 07:53  Phos  3.6     11-13  Mg     2.1     11-13    TPro  5.7<L>  /  Alb  2.8<L>  /  TBili  0.6  /  DBili  x   /  AST  10<L>  /  ALT  12  /  AlkPhos  35<L>  11-13    LIVER FUNCTIONS - ( 13 Nov 2019 07:53 )  Alb: 2.8 g/dL / Pro: 5.7 gm/dL / ALK PHOS: 35 U/L / ALT: 12 U/L / AST: 10 U/L / GGT: x           PT/INR - ( 13 Nov 2019 07:53 )   PT: 12.5 sec;   INR: 1.12 ratio         MICROBIOLOGY/CULTURES:  Culture Results:   No growth to date. (11-12 @ 19:37)  Culture Results:   No growth to date. (11-12 @ 19:37)  Culture Results:   No growth to date. (11-11 @ 12:01)  Culture Results:   No growth to date. (11-11 @ 12:01)          RADIOLOGY/EKG:    < from: CT Maxillofacial w/ IV Cont (11.12.19 @ 21:39) >    IMPRESSION:    Right facial cellulitis. No drainable abscess.    < end of copied text >

## 2019-11-16 LAB
-  AMPICILLIN/SULBACTAM: SIGNIFICANT CHANGE UP
-  CEFAZOLIN: SIGNIFICANT CHANGE UP
-  CLINDAMYCIN: SIGNIFICANT CHANGE UP
-  DAPTOMYCIN: SIGNIFICANT CHANGE UP
-  ERYTHROMYCIN: SIGNIFICANT CHANGE UP
-  GENTAMICIN: SIGNIFICANT CHANGE UP
-  LINEZOLID: SIGNIFICANT CHANGE UP
-  OXACILLIN: SIGNIFICANT CHANGE UP
-  PENICILLIN: SIGNIFICANT CHANGE UP
-  RIFAMPIN: SIGNIFICANT CHANGE UP
-  TETRACYCLINE: SIGNIFICANT CHANGE UP
-  TRIMETHOPRIM/SULFAMETHOXAZOLE: SIGNIFICANT CHANGE UP
-  VANCOMYCIN: SIGNIFICANT CHANGE UP
CULTURE RESULTS: SIGNIFICANT CHANGE UP
METHOD TYPE: SIGNIFICANT CHANGE UP
ORGANISM # SPEC MICROSCOPIC CNT: SIGNIFICANT CHANGE UP
ORGANISM # SPEC MICROSCOPIC CNT: SIGNIFICANT CHANGE UP
SPECIMEN SOURCE: SIGNIFICANT CHANGE UP

## 2019-11-16 RX ADMIN — Medication 600 MILLIGRAM(S): at 10:40

## 2019-11-16 RX ADMIN — CEFTRIAXONE 2000 MILLIGRAM(S): 500 INJECTION, POWDER, FOR SOLUTION INTRAMUSCULAR; INTRAVENOUS at 10:41

## 2019-11-16 RX ADMIN — Medication 600 MILLIGRAM(S): at 19:52

## 2019-11-16 RX ADMIN — Medication 1 TABLET(S): at 06:23

## 2019-11-16 RX ADMIN — Medication 250 MILLIGRAM(S): at 14:20

## 2019-11-16 RX ADMIN — Medication 0.5 MILLIGRAM(S): at 23:08

## 2019-11-16 RX ADMIN — Medication 1 TABLET(S): at 14:20

## 2019-11-16 RX ADMIN — Medication 1 TABLET(S): at 23:08

## 2019-11-16 RX ADMIN — Medication 600 MILLIGRAM(S): at 14:24

## 2019-11-16 NOTE — PROGRESS NOTE ADULT - ASSESSMENT
43 y/o F with h/o C sections x 3 was admitted on 11/13 for R cheek erythema / edema. Patient states that 6 days prior to admission; she noticed a pimple and she popped it. Soon after area became erythematous and swollen and patient came to ED on 11/10 and was given antibiotics. However, it did not improve. Patient also saw a plastic surgeon who attempted to drain it, but the area continued to be swollen/erythematous. Patient was prescribed oral antibiotics, but they did not help. In ER she received vancomycin IV, ceftriaxone, clindamycin and cipro.    1. Right face cellulitis with STAU. Possible infection with MRSA.  -her face is improving; but   -no further diarrhea  -she did not improve of oral bactim, then cephalexin  -on vancomycin 1 gm IV q12h and ceftriaxone 2 gm IV qd # 4  -tolerating abx well so far; no side effects noted  -f/u wound culture  -monitor closely in mari of recent history  -f/u cultures  -continue IV abx coverage for now  -monitor temps  -f/u CBC  -supportive care  2. Other issues:   -care per medicine

## 2019-11-16 NOTE — PROGRESS NOTE ADULT - ASSESSMENT
43 y/o F with no significant PMH, p/w R cheek erythema / edema    *Sepsis 2/2 facial cellulitis  Possible infection with MRSA.  -she did not improve of oral bactim, then cephalexin  -"scratchy throat" after infusions on ceftriaxone and vancomycin IV ?allergic reaction vs infusion reaction due to vancomycin- resolved; patient tolerating both vanco and rocephin after as long as vanco is run slowly  -Lactate WNL  -CT does not report abscess   -Wound Cx moderate staph aureus, awaiting sensitivity  -restarted vancomycin 1 gm IV q12h (infuse slowly) and ceftriaxone 2 gm IV qd #4  -ID consult appreciated   -Pain control       *DVT ppx  -SCDs

## 2019-11-16 NOTE — PROGRESS NOTE ADULT - SUBJECTIVE AND OBJECTIVE BOX
HOSPITALIST PROGRESS NOTE:  SUBJECTIVE:  PCP: Dr. Zbigniew Richardson    Chief Complaint: Patient is a 44y old  Female who presents with a chief complaint of R facial swelling / erythema (13 Nov 2019 01:00)      HPI:  45 y/o F with no significant PMH, p/w R cheek erythema / edema. Patient states that on Wednesday she noticed a pimple, she popped it. Soon after area became erythematous and swollen and patient came to ED and was given antibiotics. However, it did not improve. Patient also had plastic surgeon attempt to drain it in ED, but area continue to become swollen/erythematous. Patient was prescribed oral antibiotics, but they did not help. Denies nausea, vomiting, fever, chills, cough, runny nose, sore throat, nausea, vomiting, abdominal pain, CP, SOB.    11/13:  Above reviewed. Patient has no complaints.  swelling and redness coming down  11/14:  redness still there with some pus drainage that started yesterday; Patient has no other complaints   11/15:  Overnight patient had diarrhea and was R/O for cdiff;  patient has not had any further diarrhea and cdiff was never sent;   11/16:  wound better; wound cultures grew Stap aureus; sensitivities not back yet     Allergies:  Azithromycin 5 Day Dose Pack (Unknown)    REVIEW OF SYSTEMS:  See HPI. All other review of systems is negative unless indicated above.     OBJECTIVE  Physical Exam:  Vital Signs Last 24 Hrs  T(C): 36.8 (16 Nov 2019 05:15), Max: 36.8 (16 Nov 2019 05:15)  T(F): 98.3 (16 Nov 2019 05:15), Max: 98.3 (16 Nov 2019 05:15)  HR: 72 (16 Nov 2019 05:15) (72 - 91)  BP: 96/49 (16 Nov 2019 05:15) (96/49 - 106/69)  BP(mean): --  RR: 16 (16 Nov 2019 05:15) (16 - 16)  SpO2: 99% (16 Nov 2019 05:15) (98% - 100%)    Constitutional: NAD, awake and alert, well-developed  Neurological: AAO x 3, no focal deficits  HEENT: PERRLA, EOMI, MMM; R facial swelling / erythema / warmth  Neck: Soft and supple, No LAD, No JVD  Respiratory: Breath sounds are clear bilaterally, No wheezing, rales or rhonchi  Cardiovascular: S1 and S2, regular rate and rhythm; no Murmurs, gallops or rubs  Gastrointestinal: Bowel Sounds present, soft, nontender, nondistended, no guarding, no rebound tenderness  Back: No CVA tenderness   Extremities: No peripheral edema  Vascular: 2+ peripheral pulses  Musculoskeletal: 5/5 strength b/l upper and lower extremities  Skin: No rashes  Breast: Deferred  Rectal: Deferred    MEDICATIONS  (STANDING):  ciprofloxacin   IVPB 400 milliGRAM(s) IV Intermittent every 12 hours  clindamycin IVPB 600 milliGRAM(s) IV Intermittent every 8 hours  sodium chloride 0.9%. 1000 milliLiter(s) (75 mL/Hr) IV Continuous <Continuous>    Lab Results:  CBC  CBC Full  -  ( 15 Nov 2019 05:38 )  WBC Count : 7.10 K/uL  RBC Count : 3.92 M/uL  Hemoglobin : 11.4 g/dL  Hematocrit : 34.7 %  Platelet Count - Automated : 242 K/uL  Mean Cell Volume : 88.5 fl  Mean Cell Hemoglobin : 29.1 pg  Mean Cell Hemoglobin Concentration : 32.9 gm/dL  Auto Neutrophil # : x  Auto Lymphocyte # : x  Auto Monocyte # : x  Auto Eosinophil # : x  Auto Basophil # : x  Auto Neutrophil % : x  Auto Lymphocyte % : x  Auto Monocyte % : x  Auto Eosinophil % : x  Auto Basophil % : x    .		Differential:	[] Automated		[] Manual  Chemistry                        11.4   7.10  )-----------( 242      ( 15 Nov 2019 05:38 )             34.7         MICROBIOLOGY/CULTURES:  Culture Results:   Moderate Staphylococcus aureus (11-13 @ 22:00)  Culture Results:   No growth to date. (11-12 @ 19:37)  Culture Results:   No growth to date. (11-12 @ 19:37)  Culture Results:   No growth to date. (11-11 @ 12:01)  Culture Results:   No growth to date. (11-11 @ 12:01)      RADIOLOGY/EKG:    < from: CT Maxillofacial w/ IV Cont (11.12.19 @ 21:39) >    IMPRESSION:    Right facial cellulitis. No drainable abscess.    < end of copied text >

## 2019-11-16 NOTE — PROGRESS NOTE ADULT - SUBJECTIVE AND OBJECTIVE BOX
Date of service: 11-16-19 @ 10:17    Lying in bed in NAD  Right face swelling and pain are improving    ROS: no fever or chills; denies dizziness, no HA, no SOB or cough, no abdominal pain, no diarrhea or constipation; no dysuria, no legs pain, no rashes    MEDICATIONS  (STANDING):  cefTRIAXone Injectable. 2000 milliGRAM(s) IV Push every 24 hours  lactobacillus acidophilus 1 Tablet(s) Oral three times a day  vancomycin  IVPB 1000 milliGRAM(s) IV Intermittent every 12 hours      Vital Signs Last 24 Hrs  T(C): 36.8 (16 Nov 2019 05:15), Max: 36.8 (16 Nov 2019 05:15)  T(F): 98.3 (16 Nov 2019 05:15), Max: 98.3 (16 Nov 2019 05:15)  HR: 72 (16 Nov 2019 05:15) (72 - 91)  BP: 96/49 (16 Nov 2019 05:15) (96/49 - 106/69)  BP(mean): --  RR: 16 (16 Nov 2019 05:15) (16 - 16)  SpO2: 99% (16 Nov 2019 05:15) (98% - 100%)    Physical Exam:      Constitutional: frail looking  HEENT: NC/AT, EOMI, PERRLA, conjunctivae clear  Right face erythema, edema and pain; no drainage - improving  Neck: supple; thyroid not palpable  Back: no tenderness  Respiratory: respiratory effort normal; clear to auscultation  Cardiovascular: S1S2 regular, no murmurs  Abdomen: soft, not tender, not distended, positive BS  Genitourinary: no suprapubic tenderness  Lymphatic: no LN palpable  Musculoskeletal: no muscle tenderness, no joint swelling or tenderness  Extremities: no pedal edema  Neurological/ Psychiatric: AxOx3, moving all extremities  Skin: no rashes; no palpable lesions    Labs: reviewed                        11.4   7.10  )-----------( 242      ( 15 Nov 2019 05:38 )             34.7     Vancomycin Level, Trough: 21.7 ug/mL (11-15 @ 05:38)                        10.0   7.44  )-----------( 169      ( 13 Nov 2019 07:53 )             30.2     11-13    142  |  113<H>  |  6<L>  ----------------------------<  91  4.0   |  22  |  0.60    Ca    7.6<L>      13 Nov 2019 07:53  Phos  3.6     11-13  Mg     2.1     11-13    TPro  5.7<L>  /  Alb  2.8<L>  /  TBili  0.6  /  DBili  x   /  AST  10<L>  /  ALT  12  /  AlkPhos  35<L>  11-13     LIVER FUNCTIONS - ( 13 Nov 2019 07:53 )  Alb: 2.8 g/dL / Pro: 5.7 gm/dL / ALK PHOS: 35 U/L / ALT: 12 U/L / AST: 10 U/L / GGT: x             Culture - Blood (collected 11 Nov 2019 12:01)  Source: .Blood None  Preliminary Report (12 Nov 2019 16:01):    No growth to date.    Culture - Blood (collected 11 Nov 2019 12:01)  Source: .Blood None  Preliminary Report (12 Nov 2019 16:01):    No growth to date.    Culture - Other (11.13.19 @ 22:00)    Specimen Source: .Other R cheek wound    Culture Results:   Moderate Staphylococcus aureus        Radiology: all available radiological tests reviewed    < from: CT Maxillofacial w/ IV Cont (11.12.19 @ 21:39) >  Right facial cellulitis. No drainable abscess.    < end of copied text >      Advanced directives addressed: full resuscitation

## 2019-11-17 ENCOUNTER — TRANSCRIPTION ENCOUNTER (OUTPATIENT)
Age: 44
End: 2019-11-17

## 2019-11-17 VITALS
RESPIRATION RATE: 16 BRPM | DIASTOLIC BLOOD PRESSURE: 76 MMHG | SYSTOLIC BLOOD PRESSURE: 113 MMHG | TEMPERATURE: 98 F | OXYGEN SATURATION: 100 % | HEART RATE: 106 BPM

## 2019-11-17 LAB
ANION GAP SERPL CALC-SCNC: 6 MMOL/L — SIGNIFICANT CHANGE UP (ref 5–17)
BUN SERPL-MCNC: 14 MG/DL — SIGNIFICANT CHANGE UP (ref 7–23)
CALCIUM SERPL-MCNC: 8.6 MG/DL — SIGNIFICANT CHANGE UP (ref 8.5–10.1)
CHLORIDE SERPL-SCNC: 108 MMOL/L — SIGNIFICANT CHANGE UP (ref 96–108)
CO2 SERPL-SCNC: 27 MMOL/L — SIGNIFICANT CHANGE UP (ref 22–31)
CREAT SERPL-MCNC: 0.71 MG/DL — SIGNIFICANT CHANGE UP (ref 0.5–1.3)
GLUCOSE SERPL-MCNC: 90 MG/DL — SIGNIFICANT CHANGE UP (ref 70–99)
POTASSIUM SERPL-MCNC: 4.3 MMOL/L — SIGNIFICANT CHANGE UP (ref 3.5–5.3)
POTASSIUM SERPL-SCNC: 4.3 MMOL/L — SIGNIFICANT CHANGE UP (ref 3.5–5.3)
SODIUM SERPL-SCNC: 141 MMOL/L — SIGNIFICANT CHANGE UP (ref 135–145)

## 2019-11-17 RX ORDER — AZTREONAM 2 G
1 VIAL (EA) INJECTION
Qty: 20 | Refills: 0
Start: 2019-11-17 | End: 2019-11-26

## 2019-11-17 RX ORDER — LACTOBACILLUS ACIDOPHILUS 100MM CELL
1 CAPSULE ORAL
Qty: 20 | Refills: 0
Start: 2019-11-17 | End: 2019-11-26

## 2019-11-17 RX ADMIN — Medication 250 MILLIGRAM(S): at 00:17

## 2019-11-17 RX ADMIN — Medication 250 MILLIGRAM(S): at 10:21

## 2019-11-17 RX ADMIN — CEFTRIAXONE 2000 MILLIGRAM(S): 500 INJECTION, POWDER, FOR SOLUTION INTRAMUSCULAR; INTRAVENOUS at 10:21

## 2019-11-17 RX ADMIN — Medication 1 TABLET(S): at 14:08

## 2019-11-17 NOTE — PROGRESS NOTE ADULT - ASSESSMENT
45 y/o F with h/o C sections x 3 was admitted on 11/13 for R cheek erythema / edema. Patient states that 6 days prior to admission; she noticed a pimple and she popped it. Soon after area became erythematous and swollen and patient came to ED on 11/10 and was given antibiotics. However, it did not improve. Patient also saw a plastic surgeon who attempted to drain it, but the area continued to be swollen/erythematous. Patient was prescribed oral antibiotics, but they did not help. In ER she received vancomycin IV, ceftriaxone, clindamycin and cipro.    1. Right face cellulitis with MRSA. Possible infection with MRSA.  -her face is improving; but   -no further diarrhea  -she did not improve of oral bactim, then cephalexin  -on vancomycin 1 gm IV q12h and ceftriaxone 2 gm IV qd # 5  -tolerating abx well so far; no side effects noted  -f/u wound culture  -monitor closely in mari of recent history  -f/u cultures  -change abx to bactrim DS 1 tab PO q12h for 10 more days  -monitor temps  -f/u CBC  -supportive care  2. Other issues:   -care per medicine

## 2019-11-17 NOTE — PROGRESS NOTE ADULT - ASSESSMENT
45 y/o F with no significant PMH, p/w R cheek erythema / edema    *Sepsis 2/2 facial cellulitis with MRSA.  -she did not improve of oral bactim, then cephalexin  -"scratchy throat" after infusions on ceftriaxone and vancomycin IV ?allergic reaction vs infusion reaction due to vancomycin- resolved; patient tolerating both vanco and rocephin after as long as vanco is run slowly  -Lactate WNL  -CT does not report abscess   -Wound Cx moderate staph aureus, awaiting sensitivity  -restarted vancomycin 1 gm IV q12h (infuse slowly) and ceftriaxone 2 gm IV qd #5 change abx to bactrim DS 1 tab PO q12h for 10 more dayschange abx to bactrim DS 1 tab PO q12h for 10 more days  -ID consult appreciated   -Pain control       *DVT ppx  -SCDs

## 2019-11-17 NOTE — PROGRESS NOTE ADULT - REASON FOR ADMISSION
R facial swelling / erythema

## 2019-11-17 NOTE — PROGRESS NOTE ADULT - SUBJECTIVE AND OBJECTIVE BOX
Date of service: 11-17-19 @ 09:58    Sitting in bed in NAD  Right face swelling; mild local pain  No diarrhea    ROS: no fever or chills; denies dizziness, no HA, no SOB or cough, no abdominal pain, no diarrhea or constipation; no dysuria, no legs pain, no rashes    MEDICATIONS  (STANDING):  cefTRIAXone Injectable. 2000 milliGRAM(s) IV Push every 24 hours  lactobacillus acidophilus 1 Tablet(s) Oral three times a day  vancomycin  IVPB 1000 milliGRAM(s) IV Intermittent every 12 hours      Vital Signs Last 24 Hrs  T(C): 36.5 (17 Nov 2019 00:40), Max: 36.6 (16 Nov 2019 17:03)  T(F): 97.7 (17 Nov 2019 00:40), Max: 97.8 (16 Nov 2019 17:03)  HR: 84 (17 Nov 2019 00:40) (82 - 90)  BP: 98/45 (17 Nov 2019 00:40) (98/45 - 109/74)  BP(mean): --  RR: 16 (17 Nov 2019 00:40) (16 - 18)  SpO2: 95% (17 Nov 2019 00:40) (95% - 100%)    Physical Exam:      Constitutional: frail looking  HEENT: NC/AT, EOMI, PERRLA, conjunctivae clear  Right face erythema, edema and pain; no drainage - improving  Neck: supple; thyroid not palpable  Back: no tenderness  Respiratory: respiratory effort normal; clear to auscultation  Cardiovascular: S1S2 regular, no murmurs  Abdomen: soft, not tender, not distended, positive BS  Genitourinary: no suprapubic tenderness  Lymphatic: no LN palpable  Musculoskeletal: no muscle tenderness, no joint swelling or tenderness  Extremities: no pedal edema  Neurological/ Psychiatric: AxOx3, moving all extremities  Skin: no rashes; no palpable lesions    Labs: reviewed    11-17    141  |  108  |  14  ----------------------------<  90  4.3   |  27  |  0.71    Ca    8.6      17 Nov 2019 08:58                          11.4   7.10  )-----------( 242      ( 15 Nov 2019 05:38 )             34.7     Vancomycin Level, Trough: 21.7 ug/mL (11-15 @ 05:38)                        10.0   7.44  )-----------( 169      ( 13 Nov 2019 07:53 )             30.2     11-13    142  |  113<H>  |  6<L>  ----------------------------<  91  4.0   |  22  |  0.60    Ca    7.6<L>      13 Nov 2019 07:53  Phos  3.6     11-13  Mg     2.1     11-13    TPro  5.7<L>  /  Alb  2.8<L>  /  TBili  0.6  /  DBili  x   /  AST  10<L>  /  ALT  12  /  AlkPhos  35<L>  11-13     LIVER FUNCTIONS - ( 13 Nov 2019 07:53 )  Alb: 2.8 g/dL / Pro: 5.7 gm/dL / ALK PHOS: 35 U/L / ALT: 12 U/L / AST: 10 U/L / GGT: x             Culture - Blood (collected 11 Nov 2019 12:01)  Source: .Blood None  Preliminary Report (12 Nov 2019 16:01):    No growth to date.    Culture - Blood (collected 11 Nov 2019 12:01)  Source: .Blood None  Preliminary Report (12 Nov 2019 16:01):    No growth to date.    Culture - Other (11.13.19 @ 22:00)    -  Ampicillin/Sulbactam: R <=8/4    -  Cefazolin: R <=4    -  Clindamycin: S <=0.25    -  Daptomycin: S 0.5    -  Erythromycin: S <=0.25    -  Gentamicin: S <=1 Should not be used as monotherapy    -  Linezolid: S 2    -  Oxacillin: R >2    -  Penicillin: R >8    -  RIF- Rifampin: S <=1 Should not be used as monotherapy    -  Tetra/Doxy: S <=1    -  Trimethoprim/Sulfamethoxazole: S <=0.5/9.5    -  Vancomycin: S 1    Specimen Source: .Other R cheek wound    Culture Results:   Moderate Methicillin resistant Staphylococcus aureus    Organism Identification: Methicillin resistant Staphylococcus aureus    Organism: Methicillin resistant Staphylococcus aureus    Method Type: OLGA    Radiology: all available radiological tests reviewed    < from: CT Maxillofacial w/ IV Cont (11.12.19 @ 21:39) >  Right facial cellulitis. No drainable abscess.    < end of copied text >      Advanced directives addressed: full resuscitation

## 2019-11-17 NOTE — DISCHARGE NOTE PROVIDER - NSDCMRMEDTOKEN_GEN_ALL_CORE_FT
Bactrim  mg-160 mg oral tablet: 1 tab(s) orally every 12 hours   lactobacillus acidophilus oral capsule: 1 cap(s) orally 2 times a day

## 2019-11-17 NOTE — DISCHARGE NOTE PROVIDER - CARE PROVIDER_API CALL
Zbigniew Richardson)  Internal Medicine  [address] [place] [address]  Phone: [phone]  Fax: [phone]  Follow Up Time:

## 2019-11-17 NOTE — DISCHARGE NOTE NURSING/CASE MANAGEMENT/SOCIAL WORK - PATIENT PORTAL LINK FT
You can access the FollowMyHealth Patient Portal offered by [facility] by registering at the following website: [email or web address]. By joining [facility]’s FollowMyHealth portal, you will also be able to view your health information using other applications (apps) compatible with our system.

## 2019-11-17 NOTE — DISCHARGE NOTE PROVIDER - NSDCCPCAREPLAN_GEN_ALL_CORE_FT
PRINCIPAL DISCHARGE DIAGNOSIS  Diagnosis: Cellulitis of face  Assessment and Plan of Treatment: Sepsis 2/2 facial cellulitis with MRSA.  -S/P vancomycin 1 gm IV q12h and ceftriaxone 2 gm IV qd #5 days; change abx to bactrim DS 1 tab PO q12h for 10 more days with probiotic; FU with PCP; wash hands and keep area juventino

## 2019-11-17 NOTE — DISCHARGE NOTE PROVIDER - HOSPITAL COURSE
HOSPITALIST PROGRESS NOTE:    SUBJECTIVE:    PCP: Dr. Zbigniew Richardson        Chief Complaint: Patient is a 44y old  Female who presents with a chief complaint of R facial swelling / erythema (13 Nov 2019 01:00)            HPI:    43 y/o F with no significant PMH, p/w R cheek erythema / edema. Patient states that on Wednesday she noticed a pimple, she popped it. Soon after area became erythematous and swollen and patient came to ED and was given antibiotics. However, it did not improve. Patient also had plastic surgeon attempt to drain it in ED, but area continue to become swollen/erythematous. Patient was prescribed oral antibiotics, but they did not help. Denies nausea, vomiting, fever, chills, cough, runny nose, sore throat, nausea, vomiting, abdominal pain, CP, SOB.        11/13:  Above reviewed. Patient has no complaints.  swelling and redness coming down    11/14:  redness still there with some pus drainage that started yesterday; Patient has no other complaints     11/15:  Overnight patient had diarrhea and was R/O for cdiff;  patient has not had any further diarrhea and cdiff was never sent;     11/16:  wound better; wound cultures grew Stap aureus; sensitivities not back yet     11/17:  wound culture grew MRSA; wound looks much better         43 y/o F with no significant PMH, p/w R cheek erythema / edema        *Sepsis 2/2 facial cellulitis with MRSA.    -she did not improve of oral bactim, then cephalexin    -"scratchy throat" after infusions on ceftriaxone and vancomycin IV ?allergic reaction vs infusion reaction due to vancomycin- resolved; patient tolerating both vanco and rocephin after as long as vanco is run slowly    -Lactate WNL    -CT does not report abscess     -Wound Cx moderate staph aureus, awaiting sensitivity    -restarted vancomycin 1 gm IV q12h (infuse slowly) and ceftriaxone 2 gm IV qd #5 change abx to bactrim DS 1 tab PO q12h for 10 more dayschange abx to bactrim DS 1 tab PO q12h for 10 more days    -ID consult appreciated     -Pain control             *DVT ppx    -SCDs         total time 50 minutes

## 2019-11-18 LAB
CULTURE RESULTS: SIGNIFICANT CHANGE UP
CULTURE RESULTS: SIGNIFICANT CHANGE UP
SPECIMEN SOURCE: SIGNIFICANT CHANGE UP
SPECIMEN SOURCE: SIGNIFICANT CHANGE UP

## 2019-11-21 DIAGNOSIS — B95.62 METHICILLIN RESISTANT STAPHYLOCOCCUS AUREUS INFECTION AS THE CAUSE OF DISEASES CLASSIFIED ELSEWHERE: ICD-10-CM

## 2019-11-21 DIAGNOSIS — L03.211 CELLULITIS OF FACE: ICD-10-CM

## 2019-11-21 DIAGNOSIS — Z88.1 ALLERGY STATUS TO OTHER ANTIBIOTIC AGENTS STATUS: ICD-10-CM

## 2019-11-21 DIAGNOSIS — L02.01 CUTANEOUS ABSCESS OF FACE: ICD-10-CM

## 2019-11-21 DIAGNOSIS — R19.7 DIARRHEA, UNSPECIFIED: ICD-10-CM

## 2019-11-21 DIAGNOSIS — A41.9 SEPSIS, UNSPECIFIED ORGANISM: ICD-10-CM

## 2020-04-01 ENCOUNTER — APPOINTMENT (OUTPATIENT)
Dept: DERMATOLOGY | Facility: CLINIC | Age: 45
End: 2020-04-01

## 2020-12-12 ENCOUNTER — TRANSCRIPTION ENCOUNTER (OUTPATIENT)
Age: 45
End: 2020-12-12

## 2021-06-10 ENCOUNTER — APPOINTMENT (OUTPATIENT)
Dept: DERMATOLOGY | Facility: CLINIC | Age: 46
End: 2021-06-10

## 2021-06-29 NOTE — PATIENT PROFILE ADULT - OVER THE PAST TWO WEEKS, HAVE YOU FELT LITTLE INTEREST OR PLEASURE IN DOING THINGS?
VISUALLY SIGNIFICANT: Informed patient that their cataract is visually significant and that surgery is recommended to improve patient's visual acuity and/or glare symptoms. RBAs of surgery discussed and questions answered. Patient to schedule biometry measurements and surgery. no

## 2022-03-24 NOTE — ED ADULT NURSE NOTE - NSIMPLEMENTINTERV_GEN_ALL_ED
Implemented All Universal Safety Interventions:  [place] to call system. Call bell, personal items and telephone within reach. Instruct patient to call for assistance. Room bathroom lighting operational. Non-slip footwear when patient is off stretcher. Physically safe environment: no spills, clutter or unnecessary equipment. Stretcher in lowest position, wheels locked, appropriate side rails in place.
MED/SURG

## 2022-11-21 ENCOUNTER — APPOINTMENT (OUTPATIENT)
Dept: OBGYN | Facility: CLINIC | Age: 47
End: 2022-11-21

## 2022-11-21 VITALS
SYSTOLIC BLOOD PRESSURE: 94 MMHG | HEIGHT: 60 IN | BODY MASS INDEX: 23.95 KG/M2 | DIASTOLIC BLOOD PRESSURE: 60 MMHG | WEIGHT: 122 LBS

## 2022-11-21 PROCEDURE — 99386 PREV VISIT NEW AGE 40-64: CPT

## 2022-11-21 NOTE — HISTORY OF PRESENT ILLNESS
[FreeTextEntry1] : 48 yo  with LMP 11/10/22 for new gyn\par \par CC: menses irregular, 10 days then repeats after 7 days\par \par        US pelvis ' with cyst and repeat  cyst resolved\par \par OB:\par  F 8.10# Amy c/s\par  F 8.6# Amy c/s\par  F 8.2# Miguel A    c/s\par \par Sp AB no D&C\par \par GYN:\par HPV in 20s\par \par FH:\par PGM, P GAunt 50s - passed \par PGM - uterine cancer, ?ovarian ? stomach -  early 70s\par MGF - colon cancer early 70s\par \par \par \par \par         [PapSmeardate] : 2/21

## 2022-11-21 NOTE — DISCUSSION/SUMMARY
[FreeTextEntry1] : Health Maintenance:\par -Pap with HPV\par -Mammo and breast US Rx\par -TBSE\par -colonoscopy guidelines reviewed with patient\par -Achieve Vit D levels of 30-40, intake of 1100 mg daily calcium mostly thru dark green\par leafy greens and milk products, exercise 30 minutes TIW\par \par Menses irregular:\par US pelvis (7-10 days after period)\par CBC today\par return for discussion in Jan\par \par

## 2022-11-23 LAB
BILIRUB UR QL STRIP: NEGATIVE
CLARITY UR: NORMAL
COLLECTION METHOD: NORMAL
CYTOLOGY CVX/VAG DOC THIN PREP: NORMAL
GLUCOSE UR-MCNC: NEGATIVE
HCG UR QL: 0.2 EU/DL
HGB UR QL STRIP.AUTO: NEGATIVE
KETONES UR-MCNC: NEGATIVE
LEUKOCYTE ESTERASE UR QL STRIP: NEGATIVE
NITRITE UR QL STRIP: NEGATIVE
PH UR STRIP: 7.5
PROT UR STRIP-MCNC: NEGATIVE
SP GR UR STRIP: 1.01

## 2023-01-25 ENCOUNTER — APPOINTMENT (OUTPATIENT)
Dept: DERMATOLOGY | Facility: CLINIC | Age: 48
End: 2023-01-25
Payer: COMMERCIAL

## 2023-01-25 ENCOUNTER — NON-APPOINTMENT (OUTPATIENT)
Age: 48
End: 2023-01-25

## 2023-01-25 DIAGNOSIS — L82.1 OTHER SEBORRHEIC KERATOSIS: ICD-10-CM

## 2023-01-25 PROCEDURE — 99204 OFFICE O/P NEW MOD 45 MIN: CPT

## 2023-02-07 ENCOUNTER — RESULT REVIEW (OUTPATIENT)
Age: 48
End: 2023-02-07

## 2023-02-07 ENCOUNTER — OUTPATIENT (OUTPATIENT)
Dept: OUTPATIENT SERVICES | Facility: HOSPITAL | Age: 48
LOS: 1 days | End: 2023-02-07
Payer: COMMERCIAL

## 2023-02-07 ENCOUNTER — APPOINTMENT (OUTPATIENT)
Dept: CT IMAGING | Facility: CLINIC | Age: 48
End: 2023-02-07
Payer: COMMERCIAL

## 2023-02-07 ENCOUNTER — APPOINTMENT (OUTPATIENT)
Dept: ULTRASOUND IMAGING | Facility: CLINIC | Age: 48
End: 2023-02-07
Payer: COMMERCIAL

## 2023-02-07 DIAGNOSIS — K43.9 VENTRAL HERNIA WITHOUT OBSTRUCTION OR GANGRENE: ICD-10-CM

## 2023-02-07 DIAGNOSIS — N92.6 IRREGULAR MENSTRUATION, UNSPECIFIED: ICD-10-CM

## 2023-02-07 PROCEDURE — 74150 CT ABDOMEN W/O CONTRAST: CPT

## 2023-02-07 PROCEDURE — 76830 TRANSVAGINAL US NON-OB: CPT

## 2023-02-07 PROCEDURE — 74150 CT ABDOMEN W/O CONTRAST: CPT | Mod: 26

## 2023-02-07 PROCEDURE — 76830 TRANSVAGINAL US NON-OB: CPT | Mod: 26

## 2023-02-07 PROCEDURE — 76856 US EXAM PELVIC COMPLETE: CPT | Mod: 26

## 2023-02-07 PROCEDURE — 76856 US EXAM PELVIC COMPLETE: CPT

## 2023-02-10 ENCOUNTER — APPOINTMENT (OUTPATIENT)
Dept: SURGERY | Facility: CLINIC | Age: 48
End: 2023-02-10

## 2023-02-17 ENCOUNTER — APPOINTMENT (OUTPATIENT)
Dept: OBGYN | Facility: CLINIC | Age: 48
End: 2023-02-17

## 2023-02-17 ENCOUNTER — APPOINTMENT (OUTPATIENT)
Dept: DERMATOLOGY | Facility: CLINIC | Age: 48
End: 2023-02-17
Payer: COMMERCIAL

## 2023-02-17 PROCEDURE — 96573 PDT DSTR PRMLG LES PHYS/QHP: CPT

## 2023-02-17 PROCEDURE — 99213 OFFICE O/P EST LOW 20 MIN: CPT | Mod: 25

## 2023-02-17 NOTE — PHYSICAL EXAM
[FreeTextEntry3] : scaling erythematous papules;\par nose, R temple, L cheek; some on L temple, R cheek\par \par + lentigines and solar damage are present in sun exposed areas;

## 2023-03-28 ENCOUNTER — APPOINTMENT (OUTPATIENT)
Dept: OBGYN | Facility: CLINIC | Age: 48
End: 2023-03-28
Payer: COMMERCIAL

## 2023-03-28 ENCOUNTER — TRANSCRIPTION ENCOUNTER (OUTPATIENT)
Age: 48
End: 2023-03-28

## 2023-03-28 PROCEDURE — 99213 OFFICE O/P EST LOW 20 MIN: CPT | Mod: 25
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
## 2023-04-12 ENCOUNTER — NON-APPOINTMENT (OUTPATIENT)
Age: 48
End: 2023-04-12

## 2023-04-12 ENCOUNTER — LABORATORY RESULT (OUTPATIENT)
Age: 48
End: 2023-04-12

## 2023-04-12 ENCOUNTER — APPOINTMENT (OUTPATIENT)
Dept: INTERNAL MEDICINE | Facility: CLINIC | Age: 48
End: 2023-04-12
Payer: COMMERCIAL

## 2023-04-12 VITALS
WEIGHT: 120 LBS | SYSTOLIC BLOOD PRESSURE: 100 MMHG | HEART RATE: 70 BPM | HEIGHT: 62 IN | OXYGEN SATURATION: 99 % | DIASTOLIC BLOOD PRESSURE: 62 MMHG | BODY MASS INDEX: 22.08 KG/M2 | TEMPERATURE: 98.1 F

## 2023-04-12 DIAGNOSIS — Z86.59 PERSONAL HISTORY OF OTHER MENTAL AND BEHAVIORAL DISORDERS: ICD-10-CM

## 2023-04-12 DIAGNOSIS — Z80.0 FAMILY HISTORY OF MALIGNANT NEOPLASM OF DIGESTIVE ORGANS: ICD-10-CM

## 2023-04-12 DIAGNOSIS — Z23 ENCOUNTER FOR IMMUNIZATION: ICD-10-CM

## 2023-04-12 DIAGNOSIS — Z80.8 FAMILY HISTORY OF MALIGNANT NEOPLASM OF OTHER ORGANS OR SYSTEMS: ICD-10-CM
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
## 2023-04-12 DIAGNOSIS — Z80.3 FAMILY HISTORY OF MALIGNANT NEOPLASM OF BREAST: ICD-10-CM

## 2023-04-12 PROCEDURE — 93000 ELECTROCARDIOGRAM COMPLETE: CPT | Mod: 59

## 2023-04-12 PROCEDURE — 99386 PREV VISIT NEW AGE 40-64: CPT | Mod: 25

## 2023-04-12 RX ORDER — METRONIDAZOLE 10 MG/G
1 GEL TOPICAL
Qty: 60 | Refills: 0 | Status: DISCONTINUED | COMMUNITY
Start: 2022-11-03 | End: 2023-04-12

## 2023-04-12 NOTE — HEALTH RISK ASSESSMENT
[Yes] : Yes [2 - 4 times a month (2 pts)] : 2-4 times a month (2 points) [3 or 4 (1 pt)] : 3 or 4  (1 point) [Never (0 pts)] : Never (0 points) [No] : In the past 12 months have you used drugs other than those required for medical reasons? No [0] : 2) Feeling down, depressed, or hopeless: Not at all (0) [Never] : Never [No falls in past year] : Patient reported no falls in the past year [PHQ-2 Negative - No further assessment needed] : PHQ-2 Negative - No further assessment needed [None] : None [] :  [Fully functional (bathing, dressing, toileting, transferring, walking, feeding)] : Fully functional (bathing, dressing, toileting, transferring, walking, feeding) [Fully functional (using the telephone, shopping, preparing meals, housekeeping, doing laundry, using] : Fully functional and needs no help or supervision to perform IADLs (using the telephone, shopping, preparing meals, housekeeping, doing laundry, using transportation, managing medications and managing finances) [Smoke Detector] : smoke detector [Carbon Monoxide Detector] : carbon monoxide detector [Seat Belt] :  uses seat belt [Sunscreen] : uses sunscreen [With Patient/Caregiver] : , with patient/caregiver [Patient reported mammogram was normal] : Patient reported mammogram was normal [Patient reported PAP Smear was normal] : Patient reported PAP Smear was normal [Patient reported colonoscopy was normal] : Patient reported colonoscopy was normal [de-identified] : walking  [de-identified] : reg [FEQ7Cyttl] : 0 [Change in mental status noted] : No change in mental status noted [Reports changes in hearing] : Reports no changes in hearing [Guns at Home] : no guns at home [MammogramDate] : 2/28/23 [PapSmearDate] : 3/28/23 [ColonoscopyDate] : 1/1/10 [AdvancecareDate] : 4/12/23

## 2023-04-13 LAB
25(OH)D3 SERPL-MCNC: 48.8 NG/ML
ALBUMIN SERPL ELPH-MCNC: 4.6 G/DL
ALP BLD-CCNC: 40 U/L
ALT SERPL-CCNC: 13 U/L
ANION GAP SERPL CALC-SCNC: 12 MMOL/L
APPEARANCE: CLEAR
AST SERPL-CCNC: 20 U/L
BACTERIA: NEGATIVE
BASOPHILS # BLD AUTO: 0.03 K/UL
BASOPHILS NFR BLD AUTO: 0.5 %
BILIRUB DIRECT SERPL-MCNC: 0.3 MG/DL
BILIRUB SERPL-MCNC: 1.5 MG/DL
BILIRUBIN URINE: NEGATIVE
BLOOD URINE: NEGATIVE
BUN SERPL-MCNC: 11 MG/DL
CALCIUM SERPL-MCNC: 9.2 MG/DL
CHLORIDE SERPL-SCNC: 103 MMOL/L
CHOLEST SERPL-MCNC: 209 MG/DL
CO2 SERPL-SCNC: 23 MMOL/L
COLOR: COLORLESS
CREAT SERPL-MCNC: 0.67 MG/DL
EGFR: 108 ML/MIN/1.73M2
EOSINOPHIL # BLD AUTO: 0.06 K/UL
EOSINOPHIL NFR BLD AUTO: 1 %
ESTIMATED AVERAGE GLUCOSE: 94 MG/DL
FERRITIN SERPL-MCNC: 30 NG/ML
GLUCOSE QUALITATIVE U: NEGATIVE
GLUCOSE SERPL-MCNC: 87 MG/DL
HBA1C MFR BLD HPLC: 4.9 %
HCT VFR BLD CALC: 38.1 %
HCV AB SER QL: NONREACTIVE
HCV S/CO RATIO: 0.08 S/CO
HDLC SERPL-MCNC: 98 MG/DL
HGB BLD-MCNC: 12.6 G/DL
HYALINE CASTS: 0 /LPF
IMM GRANULOCYTES NFR BLD AUTO: 0.2 %
IRON SERPL-MCNC: 158 UG/DL
KETONES URINE: NEGATIVE
LDLC SERPL CALC-MCNC: 100 MG/DL
LEUKOCYTE ESTERASE URINE: NEGATIVE
LYMPHOCYTES # BLD AUTO: 1.66 K/UL
LYMPHOCYTES NFR BLD AUTO: 28.3 %
MAN DIFF?: NORMAL
MCHC RBC-ENTMCNC: 30.1 PG
MCHC RBC-ENTMCNC: 33.1 GM/DL
MCV RBC AUTO: 91.1 FL
MICROSCOPIC-UA: NORMAL
MONOCYTES # BLD AUTO: 0.36 K/UL
MONOCYTES NFR BLD AUTO: 6.1 %
NEUTROPHILS # BLD AUTO: 3.75 K/UL
NEUTROPHILS NFR BLD AUTO: 63.9 %
NITRITE URINE: NEGATIVE
NONHDLC SERPL-MCNC: 111 MG/DL
PH URINE: 6.5
PLATELET # BLD AUTO: 230 K/UL
POTASSIUM SERPL-SCNC: 4.2 MMOL/L
PROT SERPL-MCNC: 6.8 G/DL
PROTEIN URINE: NEGATIVE
RBC # BLD: 4.18 M/UL
RBC # FLD: 13.8 %
RED BLOOD CELLS URINE: 2 /HPF
SODIUM SERPL-SCNC: 138 MMOL/L
SPECIFIC GRAVITY URINE: 1
SQUAMOUS EPITHELIAL CELLS: 1 /HPF
T4 SERPL-MCNC: 5.8 UG/DL
TRIGL SERPL-MCNC: 52 MG/DL
TSH SERPL-ACNC: 2.07 UIU/ML
UROBILINOGEN URINE: NORMAL
WBC # FLD AUTO: 5.87 K/UL
WHITE BLOOD CELLS URINE: 0 /HPF

## 2023-04-14 ENCOUNTER — NON-APPOINTMENT (OUTPATIENT)
Age: 48
End: 2023-04-14

## 2023-04-25 ENCOUNTER — APPOINTMENT (OUTPATIENT)
Dept: OBGYN | Facility: CLINIC | Age: 48
End: 2023-04-25

## 2023-05-31 RX ORDER — RIZATRIPTAN BENZOATE 10 MG/1
10 TABLET ORAL
Qty: 15 | Refills: 1 | Status: ACTIVE | COMMUNITY
Start: 1900-01-01 | End: 1900-01-01

## 2023-07-14 ENCOUNTER — APPOINTMENT (OUTPATIENT)
Dept: OBGYN | Facility: CLINIC | Age: 48
End: 2023-07-14
Payer: COMMERCIAL

## 2023-07-14 VITALS
WEIGHT: 117 LBS | HEIGHT: 62 IN | BODY MASS INDEX: 21.53 KG/M2 | DIASTOLIC BLOOD PRESSURE: 70 MMHG | SYSTOLIC BLOOD PRESSURE: 110 MMHG

## 2023-07-14 PROCEDURE — 99213 OFFICE O/P EST LOW 20 MIN: CPT

## 2023-07-14 NOTE — HISTORY OF PRESENT ILLNESS
[FreeTextEntry1] : pt with strong FH breast and pancreatic cancer returns for results of MyRiad testing.\par \par BRCA 1/2 neg \par also negative for CHEK-2 PHYLLIS, CDH1, PalB2, PTEN, STK11 and TP53\par \par

## 2023-07-14 NOTE — DISCUSSION/SUMMARY
[FreeTextEntry1] : Despite negative BRCA and accessory gene panel negativity, Octavia remains at higher risk of breast cancer.\par Calculated risk by DOMENICA is 40%\par \par reviewed her Mammo/Breast US\par Suggest 8/23 having breast MRI. Suggest brining copies of MYRIAD to radiology so they can see the calculated risk.\par \par Repeat Mammo / Breast US 2/24\par \par

## 2023-08-16 ENCOUNTER — NON-APPOINTMENT (OUTPATIENT)
Age: 48
End: 2023-08-16

## 2023-08-17 ENCOUNTER — TRANSCRIPTION ENCOUNTER (OUTPATIENT)
Age: 48
End: 2023-08-17

## 2023-08-31 ENCOUNTER — APPOINTMENT (OUTPATIENT)
Dept: INTERNAL MEDICINE | Facility: CLINIC | Age: 48
End: 2023-08-31
Payer: COMMERCIAL

## 2023-08-31 DIAGNOSIS — U07.1 COVID-19: ICD-10-CM

## 2023-08-31 PROCEDURE — 99443: CPT

## 2023-08-31 NOTE — HISTORY OF PRESENT ILLNESS
[FreeTextEntry8] : Pt agrees to telephonic visit. She is in her home and I am in my office . The visit is being conducted via audio only. The visit and review of relevant material took 31 minutes. Pt tested positive for covid, had covdid last winter and it was not so bad. Had cough,   also bad headache and fever  , chills and sweats and severe body aches.   Very tired. No shortness of breath but  get exhausted easily, poor appetite.  No  GI sx like nausea or diarrhea.   Two other daughters  and   who are well.

## 2023-09-12 ENCOUNTER — APPOINTMENT (OUTPATIENT)
Dept: OBGYN | Facility: CLINIC | Age: 48
End: 2023-09-12
Payer: COMMERCIAL

## 2023-09-12 VITALS
SYSTOLIC BLOOD PRESSURE: 106 MMHG | DIASTOLIC BLOOD PRESSURE: 60 MMHG | HEIGHT: 62 IN | WEIGHT: 122 LBS | BODY MASS INDEX: 22.45 KG/M2

## 2023-09-12 DIAGNOSIS — N63.22 UNSPECIFIED LUMP IN THE LEFT BREAST, UPPER INNER QUADRANT: ICD-10-CM

## 2023-09-12 PROCEDURE — 99213 OFFICE O/P EST LOW 20 MIN: CPT

## 2023-09-15 NOTE — H&P ADULT - HISTORY OF PRESENT ILLNESS
[place] ordered for pt.   45 y/o F with no significant PMH, p/w R cheek erythema / edema. Patient states that on Wednesday she noticed a pimple, she popped it. Soon after area became erythematous and swollen and patient came to ED and was given antibiotics. However, it did not improve. Patient also had plastic surgeon attempt to drain it in ED, but area continue to become swollen/erythematous. Patient was prescribed oral antibiotics, but they did not help. Denies nausea, vomiting, fever, chills, cough, runny nose, sore throat, nausea, vomiting, abdominal pain, CP, SOB.    PSH: C-sections x 3    Social hx: Tobacco - in college, etoh - couple of drinks on the weekend, drugs - denies    Family Hx: Mother - COPD

## 2023-09-20 ENCOUNTER — APPOINTMENT (OUTPATIENT)
Dept: ULTRASOUND IMAGING | Facility: CLINIC | Age: 48
End: 2023-09-20

## 2023-09-25 ENCOUNTER — TRANSCRIPTION ENCOUNTER (OUTPATIENT)
Age: 48
End: 2023-09-25

## 2023-09-27 ENCOUNTER — NON-APPOINTMENT (OUTPATIENT)
Age: 48
End: 2023-09-27

## 2023-10-12 ENCOUNTER — APPOINTMENT (OUTPATIENT)
Dept: RADIOLOGY | Facility: CLINIC | Age: 48
End: 2023-10-12
Payer: COMMERCIAL

## 2023-10-12 ENCOUNTER — OUTPATIENT (OUTPATIENT)
Dept: OUTPATIENT SERVICES | Facility: HOSPITAL | Age: 48
LOS: 1 days | End: 2023-10-12
Payer: COMMERCIAL

## 2023-10-12 ENCOUNTER — APPOINTMENT (OUTPATIENT)
Dept: INTERNAL MEDICINE | Facility: CLINIC | Age: 48
End: 2023-10-12
Payer: COMMERCIAL

## 2023-10-12 VITALS
WEIGHT: 120 LBS | DIASTOLIC BLOOD PRESSURE: 62 MMHG | HEART RATE: 74 BPM | SYSTOLIC BLOOD PRESSURE: 108 MMHG | OXYGEN SATURATION: 98 % | TEMPERATURE: 98.2 F | BODY MASS INDEX: 22.08 KG/M2 | HEIGHT: 62 IN

## 2023-10-12 DIAGNOSIS — Z00.8 ENCOUNTER FOR OTHER GENERAL EXAMINATION: ICD-10-CM

## 2023-10-12 PROCEDURE — G0008: CPT

## 2023-10-12 PROCEDURE — 71046 X-RAY EXAM CHEST 2 VIEWS: CPT | Mod: 26

## 2023-10-12 PROCEDURE — 71046 X-RAY EXAM CHEST 2 VIEWS: CPT

## 2023-10-12 PROCEDURE — 90686 IIV4 VACC NO PRSV 0.5 ML IM: CPT

## 2023-10-12 PROCEDURE — 99213 OFFICE O/P EST LOW 20 MIN: CPT | Mod: 25

## 2023-10-12 RX ORDER — NIRMATRELVIR AND RITONAVIR 300-100 MG
20 X 150 MG & KIT ORAL
Qty: 30 | Refills: 0 | Status: DISCONTINUED | COMMUNITY
Start: 2023-08-31 | End: 2023-10-12

## 2023-10-19 ENCOUNTER — NON-APPOINTMENT (OUTPATIENT)
Age: 48
End: 2023-10-19

## 2023-11-28 ENCOUNTER — APPOINTMENT (OUTPATIENT)
Dept: OBGYN | Facility: CLINIC | Age: 48
End: 2023-11-28
Payer: COMMERCIAL

## 2023-11-28 VITALS
WEIGHT: 123 LBS | SYSTOLIC BLOOD PRESSURE: 124 MMHG | BODY MASS INDEX: 22.63 KG/M2 | HEIGHT: 62 IN | DIASTOLIC BLOOD PRESSURE: 76 MMHG

## 2023-11-28 DIAGNOSIS — Z12.39 ENCOUNTER FOR OTHER SCREENING FOR MALIGNANT NEOPLASM OF BREAST: ICD-10-CM

## 2023-11-28 DIAGNOSIS — Z12.4 ENCOUNTER FOR SCREENING FOR MALIGNANT NEOPLASM OF CERVIX: ICD-10-CM

## 2023-11-28 PROCEDURE — 99396 PREV VISIT EST AGE 40-64: CPT

## 2023-12-18 ENCOUNTER — TRANSCRIPTION ENCOUNTER (OUTPATIENT)
Age: 48
End: 2023-12-18

## 2023-12-18 LAB
CYTOLOGY CVX/VAG DOC THIN PREP: ABNORMAL
HPV HIGH+LOW RISK DNA PNL CVX: NOT DETECTED

## 2023-12-22 NOTE — HISTORY OF PRESENT ILLNESS
[de-identified] : Pt. presents for skin check;\par c/o few spots of concern;  lesion on R temple;  treated with cryo, picato in past; but recurred; \par Severity:  mild  \par Modifying factors:  none\par Associated symptoms:  none\par Context:  no association with activity\par

## 2023-12-22 NOTE — PHYSICAL EXAM
[Full Body Skin Exam Performed] : performed [FreeTextEntry3] : Skin examination performed of the face, neck, trunk, arms, legs;  The patient is well, alert and oriented, pleasant and cooperative. Eyelids, conjunctivae, oral mucosa, digits and nails all normal.   No cervical adenopathy.  Normal findings include:  scaling erythematous patch;  R temple/periorbital;   similar patches on nose, L cheek;  35 AKs total on face  Angiomas + lentigines and solar damage are present in sun exposed areas;  Multiple benign nevi were noted.   No lesions were suspicious for malignancy.

## 2023-12-22 NOTE — ASSESSMENT
[FreeTextEntry1] : Complete skin examination is negative for malignancy; Multiple new concerns were addressed and discussed. Therapeutic options and their risks and benefits; along with multiple diagnostic possibilities were discussed at length; risks and benefits of skin biopsy and/or other further study were discussed;  Multiple patchy focal AKs; # 35 total AKs on face recurrent s/p cryo;   PMHx and joi reviewed, pt. has no history of porphyria or other photosensitive disorders, No hx of light sensitivity, no photosensitive medications;  options discussed;  Risks and benefits of photodynamic therapy were discussed including redness, pain, and swelling.    recommend PDT;  for all areas;   Follow up for TBSE in 1 year

## 2024-01-10 ENCOUNTER — NON-APPOINTMENT (OUTPATIENT)
Age: 49
End: 2024-01-10

## 2024-01-10 ENCOUNTER — APPOINTMENT (OUTPATIENT)
Dept: GASTROENTEROLOGY | Facility: CLINIC | Age: 49
End: 2024-01-10

## 2024-01-24 ENCOUNTER — APPOINTMENT (OUTPATIENT)
Dept: DERMATOLOGY | Facility: CLINIC | Age: 49
End: 2024-01-24

## 2024-02-14 ENCOUNTER — APPOINTMENT (OUTPATIENT)
Dept: DERMATOLOGY | Facility: CLINIC | Age: 49
End: 2024-02-14

## 2024-03-13 ENCOUNTER — APPOINTMENT (OUTPATIENT)
Dept: DERMATOLOGY | Facility: CLINIC | Age: 49
End: 2024-03-13
Payer: COMMERCIAL

## 2024-03-13 DIAGNOSIS — L82.0 INFLAMED SEBORRHEIC KERATOSIS: ICD-10-CM

## 2024-03-13 DIAGNOSIS — L57.0 ACTINIC KERATOSIS: ICD-10-CM

## 2024-03-13 DIAGNOSIS — D22.5 MELANOCYTIC NEVI OF TRUNK: ICD-10-CM

## 2024-03-13 DIAGNOSIS — L81.4 OTHER MELANIN HYPERPIGMENTATION: ICD-10-CM

## 2024-03-13 PROCEDURE — 17000 DESTRUCT PREMALG LESION: CPT

## 2024-03-13 PROCEDURE — 17003 DESTRUCT PREMALG LES 2-14: CPT

## 2024-03-13 PROCEDURE — 99214 OFFICE O/P EST MOD 30 MIN: CPT | Mod: 25

## 2024-03-13 NOTE — HISTORY OF PRESENT ILLNESS
[de-identified] : Pt. presents for skin check; c/o few spots of concern;  Hx PDT for AKs in past- c/o persistent lesion R temple, also itchy spot in scalp Severity:  mild   Modifying factors:  none Associated symptoms:  none Context:  no association with activity

## 2024-03-13 NOTE — ASSESSMENT
[FreeTextEntry1] : Complete skin examination is negative for malignancy; Multiple new concerns were addressed and discussed. Therapeutic options and their risks and benefits; along with multiple diagnostic possibilities were discussed at length; risks and benefits of skin biopsy and/or other further study were discussed; Benign nevi, DF L leg  Multiple patchy focal AKs; # 35 total AKs on face recurrent s/p cryo;   PMHx and joi reviewed, pt. has no history of porphyria or other photosensitive disorders, No hx of light sensitivity, no photosensitive medications;  options discussed;  Risks and benefits of photodynamic therapy were discussed including redness, pain, and swelling.    recommend PDT;  for all areas;  Did well with prior treatments;  cryo to AK R temple, also excoriated SK/prurigo nodule R scalp hairline  Risks and benefits of Intense Pulsed Light Therapy were discussed including swelling, discoloration, lack of response, and need for multiple treatments;  discussed cosmetic IPL for face after PDT complete   Follow up for TBSE in 1 year

## 2024-03-13 NOTE — PHYSICAL EXAM
[Full Body Skin Exam Performed] : performed [FreeTextEntry3] : Skin examination performed of the face, neck, trunk, arms, legs;  The patient is well, alert and oriented, pleasant and cooperative. Eyelids, conjunctivae, oral mucosa, digits and nails all normal.   No cervical adenopathy.  Normal findings include:  scaling erythematous papule;  R temple/periorbital;   similar patches on nose, L cheek;  35 AKs total on face excoriated papule R occipital hairline  Angiomas + lentigines and solar damage are present in sun exposed areas;  Multiple benign nevi were noted. Darker pinpoint regular nevus left lower back Firm, pink, subcutaneous buttonholing papule Left shin  No lesions were suspicious for malignancy.

## 2024-05-09 ENCOUNTER — LABORATORY RESULT (OUTPATIENT)
Age: 49
End: 2024-05-09

## 2024-05-09 ENCOUNTER — APPOINTMENT (OUTPATIENT)
Dept: INTERNAL MEDICINE | Facility: CLINIC | Age: 49
End: 2024-05-09
Payer: COMMERCIAL

## 2024-05-09 ENCOUNTER — NON-APPOINTMENT (OUTPATIENT)
Age: 49
End: 2024-05-09

## 2024-05-09 VITALS
DIASTOLIC BLOOD PRESSURE: 58 MMHG | SYSTOLIC BLOOD PRESSURE: 90 MMHG | WEIGHT: 120 LBS | BODY MASS INDEX: 22.08 KG/M2 | TEMPERATURE: 97.7 F | OXYGEN SATURATION: 98 % | HEART RATE: 77 BPM | HEIGHT: 62 IN

## 2024-05-09 DIAGNOSIS — N92.6 IRREGULAR MENSTRUATION, UNSPECIFIED: ICD-10-CM

## 2024-05-09 DIAGNOSIS — R41.840 ATTENTION AND CONCENTRATION DEFICIT: ICD-10-CM

## 2024-05-09 DIAGNOSIS — G43.909 MIGRAINE, UNSPECIFIED, NOT INTRACTABLE, W/OUT STATUS MIGRAINOSUS: ICD-10-CM

## 2024-05-09 DIAGNOSIS — R92.8 OTHER ABNORMAL AND INCONCLUSIVE FINDINGS ON DIAGNOSTIC IMAGING OF BREAST: ICD-10-CM

## 2024-05-09 DIAGNOSIS — E53.8 DEFICIENCY OF OTHER SPECIFIED B GROUP VITAMINS: ICD-10-CM

## 2024-05-09 DIAGNOSIS — K43.9 VENTRAL HERNIA W/OUT OBSTRUCTION OR GANGRENE: ICD-10-CM

## 2024-05-09 DIAGNOSIS — D23.9 OTHER BENIGN NEOPLASM OF SKIN, UNSPECIFIED: ICD-10-CM

## 2024-05-09 DIAGNOSIS — Z00.00 ENCOUNTER FOR GENERAL ADULT MEDICAL EXAMINATION W/OUT ABNORMAL FINDINGS: ICD-10-CM

## 2024-05-09 DIAGNOSIS — Z12.39 ENCOUNTER FOR OTHER SCREENING FOR MALIGNANT NEOPLASM OF BREAST: ICD-10-CM

## 2024-05-09 PROCEDURE — 93000 ELECTROCARDIOGRAM COMPLETE: CPT

## 2024-05-09 PROCEDURE — 99396 PREV VISIT EST AGE 40-64: CPT

## 2024-05-09 PROCEDURE — 36415 COLL VENOUS BLD VENIPUNCTURE: CPT

## 2024-05-09 NOTE — HEALTH RISK ASSESSMENT
[2 - 3 times a week (3 pts)] : 2 - 3  times a week (3 points) [1 or 2 (0 pts)] : 1 or 2 (0 points) [Less than monthly (1 pt)] : Less than monthly (1 point) [No] : In the past 12 months have you used drugs other than those required for medical reasons? No [0] : 2) Feeling down, depressed, or hopeless: Not at all (0) [Never] : Never [No falls in past year] : Patient reported no falls in the past year [PHQ-2 Negative - No further assessment needed] : PHQ-2 Negative - No further assessment needed [de-identified] : [place] [de-identified] : reg [AOP2Rtiks] : 0 [EyeExamDate] : 03/24 [Change in mental status noted] : No change in mental status noted [None] : None [With Significant Other] : lives with significant other [] :  [Fully functional (bathing, dressing, toileting, transferring, walking, feeding)] : Fully functional (bathing, dressing, toileting, transferring, walking, feeding) [Fully functional (using the telephone, shopping, preparing meals, housekeeping, doing laundry, using] : Fully functional and needs no help or supervision to perform IADLs (using the telephone, shopping, preparing meals, housekeeping, doing laundry, using transportation, managing medications and managing finances) [Reports changes in hearing] : Reports no changes in hearing [Smoke Detector] : smoke detector [Carbon Monoxide Detector] : carbon monoxide detector [Guns at Home] : no guns at home [Seat Belt] :  uses seat belt [Sunscreen] : uses sunscreen [MammogramDate] : 9/1/23 [PapSmearDate] : 11/23 [ColonoscopyDate] : 01/10 [With Patient/Caregiver] : , with patient/caregiver [AdvancecareDate] : 5/24/

## 2024-05-12 LAB
25(OH)D3 SERPL-MCNC: 51.6 NG/ML
ALBUMIN SERPL ELPH-MCNC: 4.6 G/DL
ALP BLD-CCNC: 38 U/L
ALT SERPL-CCNC: 14 U/L
ANION GAP SERPL CALC-SCNC: 11 MMOL/L
APPEARANCE: CLEAR
AST SERPL-CCNC: 19 U/L
BACTERIA: ABNORMAL /HPF
BASOPHILS # BLD AUTO: 0.05 K/UL
BASOPHILS NFR BLD AUTO: 0.7 %
BILIRUB DIRECT SERPL-MCNC: 0.2 MG/DL
BILIRUB SERPL-MCNC: 1 MG/DL
BILIRUBIN URINE: NEGATIVE
BLOOD URINE: NEGATIVE
BUN SERPL-MCNC: 15 MG/DL
CALCIUM SERPL-MCNC: 9.2 MG/DL
CAST: NORMAL /LPF
CHLORIDE SERPL-SCNC: 102 MMOL/L
CHOLEST SERPL-MCNC: 187 MG/DL
CO2 SERPL-SCNC: 25 MMOL/L
COLOR: YELLOW
CREAT SERPL-MCNC: 0.71 MG/DL
EGFR: 105 ML/MIN/1.73M2
EOSINOPHIL # BLD AUTO: 0.16 K/UL
EOSINOPHIL NFR BLD AUTO: 2.2 %
EPITHELIAL CELLS: 4 /HPF
ESTIMATED AVERAGE GLUCOSE: 100 MG/DL
FERRITIN SERPL-MCNC: 26 NG/ML
GLUCOSE QUALITATIVE U: NEGATIVE MG/DL
GLUCOSE SERPL-MCNC: 94 MG/DL
HBA1C MFR BLD HPLC: 5.1 %
HCT VFR BLD CALC: 37.9 %
HCV AB SER QL: NONREACTIVE
HCV S/CO RATIO: 0.08 S/CO
HDLC SERPL-MCNC: 87 MG/DL
HGB BLD-MCNC: 12.7 G/DL
IMM GRANULOCYTES NFR BLD AUTO: 0.3 %
IRON SERPL-MCNC: 106 UG/DL
KETONES URINE: NEGATIVE MG/DL
LDLC SERPL CALC-MCNC: 91 MG/DL
LEUKOCYTE ESTERASE URINE: NEGATIVE
LYMPHOCYTES # BLD AUTO: 1.83 K/UL
LYMPHOCYTES NFR BLD AUTO: 24.8 %
MAN DIFF?: NORMAL
MCHC RBC-ENTMCNC: 30.3 PG
MCHC RBC-ENTMCNC: 33.5 GM/DL
MCV RBC AUTO: 90.5 FL
MICROSCOPIC-UA: NORMAL
MONOCYTES # BLD AUTO: 0.47 K/UL
MONOCYTES NFR BLD AUTO: 6.4 %
NEUTROPHILS # BLD AUTO: 4.86 K/UL
NEUTROPHILS NFR BLD AUTO: 65.6 %
NITRITE URINE: NEGATIVE
NONHDLC SERPL-MCNC: 100 MG/DL
PH URINE: 6
PLATELET # BLD AUTO: 239 K/UL
POTASSIUM SERPL-SCNC: 4.5 MMOL/L
PROT SERPL-MCNC: 6.8 G/DL
PROTEIN URINE: NEGATIVE MG/DL
RBC # BLD: 4.19 M/UL
RBC # FLD: 13.7 %
RED BLOOD CELLS URINE: NORMAL /HPF
REVIEW: NORMAL
SODIUM SERPL-SCNC: 138 MMOL/L
SPECIFIC GRAVITY URINE: 1.01
T4 SERPL-MCNC: 6.2 UG/DL
TRIGL SERPL-MCNC: 46 MG/DL
TSH SERPL-ACNC: 1.85 UIU/ML
UROBILINOGEN URINE: 0.2 MG/DL
VIT B12 SERPL-MCNC: 818 PG/ML
WBC # FLD AUTO: 7.39 K/UL
WHITE BLOOD CELLS URINE: 0 /HPF
YEAST-LIKE CELLS: PRESENT

## 2024-05-13 ENCOUNTER — TRANSCRIPTION ENCOUNTER (OUTPATIENT)
Age: 49
End: 2024-05-13

## 2024-05-13 ENCOUNTER — NON-APPOINTMENT (OUTPATIENT)
Age: 49
End: 2024-05-13

## 2024-05-29 ENCOUNTER — APPOINTMENT (OUTPATIENT)
Dept: DERMATOLOGY | Facility: CLINIC | Age: 49
End: 2024-05-29

## 2024-12-03 ENCOUNTER — APPOINTMENT (OUTPATIENT)
Dept: OBGYN | Facility: CLINIC | Age: 49
End: 2024-12-03

## 2025-01-15 ENCOUNTER — APPOINTMENT (OUTPATIENT)
Dept: DERMATOLOGY | Facility: CLINIC | Age: 50
End: 2025-01-15
Payer: COMMERCIAL

## 2025-01-15 DIAGNOSIS — L81.4 OTHER MELANIN HYPERPIGMENTATION: ICD-10-CM

## 2025-01-15 DIAGNOSIS — L40.9 PSORIASIS, UNSPECIFIED: ICD-10-CM

## 2025-01-15 DIAGNOSIS — L57.0 ACTINIC KERATOSIS: ICD-10-CM

## 2025-01-15 PROCEDURE — 99214 OFFICE O/P EST MOD 30 MIN: CPT

## 2025-01-15 RX ORDER — FLUOROURACIL 50 MG/G
5 CREAM TOPICAL
Qty: 1 | Refills: 2 | Status: ACTIVE | COMMUNITY
Start: 2025-01-15 | End: 1900-01-01

## 2025-01-15 RX ORDER — CALCIPOTRIENE 50 UG/G
0.01 OINTMENT TOPICAL
Qty: 1 | Refills: 1 | Status: ACTIVE | COMMUNITY
Start: 2025-01-15 | End: 1900-01-01

## 2025-03-14 ENCOUNTER — APPOINTMENT (OUTPATIENT)
Dept: DERMATOLOGY | Facility: CLINIC | Age: 50
End: 2025-03-14

## 2025-04-11 ENCOUNTER — NON-APPOINTMENT (OUTPATIENT)
Age: 50
End: 2025-04-11

## 2025-05-13 ENCOUNTER — NON-APPOINTMENT (OUTPATIENT)
Age: 50
End: 2025-05-13

## 2025-05-14 ENCOUNTER — LABORATORY RESULT (OUTPATIENT)
Age: 50
End: 2025-05-14

## 2025-05-14 ENCOUNTER — NON-APPOINTMENT (OUTPATIENT)
Age: 50
End: 2025-05-14

## 2025-05-14 ENCOUNTER — APPOINTMENT (OUTPATIENT)
Dept: INTERNAL MEDICINE | Facility: CLINIC | Age: 50
End: 2025-05-14
Payer: COMMERCIAL

## 2025-05-14 VITALS
HEART RATE: 75 BPM | SYSTOLIC BLOOD PRESSURE: 108 MMHG | HEIGHT: 60 IN | BODY MASS INDEX: 23.95 KG/M2 | WEIGHT: 122 LBS | OXYGEN SATURATION: 99 % | DIASTOLIC BLOOD PRESSURE: 70 MMHG | TEMPERATURE: 97.9 F

## 2025-05-14 DIAGNOSIS — K43.9 VENTRAL HERNIA W/OUT OBSTRUCTION OR GANGRENE: ICD-10-CM

## 2025-05-14 DIAGNOSIS — L40.9 PSORIASIS, UNSPECIFIED: ICD-10-CM

## 2025-05-14 DIAGNOSIS — E53.8 DEFICIENCY OF OTHER SPECIFIED B GROUP VITAMINS: ICD-10-CM

## 2025-05-14 DIAGNOSIS — U07.1 COVID-19: ICD-10-CM

## 2025-05-14 DIAGNOSIS — N63.22 UNSPECIFIED LUMP IN THE LEFT BREAST, UPPER INNER QUADRANT: ICD-10-CM

## 2025-05-14 DIAGNOSIS — Z12.4 ENCOUNTER FOR SCREENING FOR MALIGNANT NEOPLASM OF CERVIX: ICD-10-CM

## 2025-05-14 DIAGNOSIS — Z12.39 ENCOUNTER FOR OTHER SCREENING FOR MALIGNANT NEOPLASM OF BREAST: ICD-10-CM

## 2025-05-14 DIAGNOSIS — R41.840 ATTENTION AND CONCENTRATION DEFICIT: ICD-10-CM

## 2025-05-14 DIAGNOSIS — G43.909 MIGRAINE, UNSPECIFIED, NOT INTRACTABLE, W/OUT STATUS MIGRAINOSUS: ICD-10-CM

## 2025-05-14 DIAGNOSIS — Z23 ENCOUNTER FOR IMMUNIZATION: ICD-10-CM

## 2025-05-14 DIAGNOSIS — Z79.818 LONG TERM (CURRENT) USE OF OTHER AGENTS AFFECTING ESTROGEN RECEPTORS AND ESTROGEN LEVELS: ICD-10-CM

## 2025-05-14 DIAGNOSIS — F43.9 REACTION TO SEVERE STRESS, UNSPECIFIED: ICD-10-CM

## 2025-05-14 DIAGNOSIS — R92.8 OTHER ABNORMAL AND INCONCLUSIVE FINDINGS ON DIAGNOSTIC IMAGING OF BREAST: ICD-10-CM

## 2025-05-14 DIAGNOSIS — Z00.00 ENCOUNTER FOR GENERAL ADULT MEDICAL EXAMINATION W/OUT ABNORMAL FINDINGS: ICD-10-CM

## 2025-05-14 LAB
DATE COLLECTED: NORMAL
HEMOCCULT SP1 STL QL: NEGATIVE
QUALITY CONTROL: YES

## 2025-05-14 PROCEDURE — 93000 ELECTROCARDIOGRAM COMPLETE: CPT

## 2025-05-14 PROCEDURE — 36415 COLL VENOUS BLD VENIPUNCTURE: CPT

## 2025-05-14 PROCEDURE — 99396 PREV VISIT EST AGE 40-64: CPT

## 2025-05-14 RX ORDER — ESTRADIOL 0.05 MG/D
0.05 PATCH, EXTENDED RELEASE TRANSDERMAL
Refills: 0 | Status: ACTIVE | COMMUNITY

## 2025-05-14 RX ORDER — PROGESTERONE 100 MG/1
100 CAPSULE ORAL
Refills: 0 | Status: ACTIVE | COMMUNITY

## 2025-05-20 LAB
25(OH)D3 SERPL-MCNC: 51.9 NG/ML
ALBUMIN SERPL ELPH-MCNC: 4.3 G/DL
ALP BLD-CCNC: 44 U/L
ALT SERPL-CCNC: 22 U/L
ANION GAP SERPL CALC-SCNC: 17 MMOL/L
APPEARANCE: CLEAR
AST SERPL-CCNC: 24 U/L
BACTERIA: ABNORMAL /HPF
BASOPHILS # BLD AUTO: 0.04 K/UL
BASOPHILS NFR BLD AUTO: 0.8 %
BILIRUB DIRECT SERPL-MCNC: 0.1 MG/DL
BILIRUB SERPL-MCNC: 0.5 MG/DL
BILIRUBIN URINE: NEGATIVE
BLOOD URINE: NEGATIVE
BUN SERPL-MCNC: 15 MG/DL
CALCIUM SERPL-MCNC: 8.9 MG/DL
CAST: 0 /LPF
CHLORIDE SERPL-SCNC: 106 MMOL/L
CHOLEST SERPL-MCNC: 191 MG/DL
CO2 SERPL-SCNC: 18 MMOL/L
COLOR: YELLOW
CREAT SERPL-MCNC: 0.63 MG/DL
EGFRCR SERPLBLD CKD-EPI 2021: 109 ML/MIN/1.73M2
EOSINOPHIL # BLD AUTO: 0.19 K/UL
EOSINOPHIL NFR BLD AUTO: 3.8 %
EPITHELIAL CELLS: 1 /HPF
ESTIMATED AVERAGE GLUCOSE: 97 MG/DL
FERRITIN SERPL-MCNC: 29 NG/ML
GLUCOSE QUALITATIVE U: NEGATIVE MG/DL
GLUCOSE SERPL-MCNC: 86 MG/DL
HBA1C MFR BLD HPLC: 5 %
HCT VFR BLD CALC: 36.7 %
HCV AB SER QL: NONREACTIVE
HCV S/CO RATIO: 0.11 S/CO
HDLC SERPL-MCNC: 82 MG/DL
HGB BLD-MCNC: 12 G/DL
IMM GRANULOCYTES NFR BLD AUTO: 0.2 %
IRON SERPL-MCNC: 69 UG/DL
KETONES URINE: NEGATIVE MG/DL
LDLC SERPL-MCNC: 99 MG/DL
LEUKOCYTE ESTERASE URINE: NEGATIVE
LYMPHOCYTES # BLD AUTO: 1.76 K/UL
LYMPHOCYTES NFR BLD AUTO: 35.3 %
MAN DIFF?: NORMAL
MCHC RBC-ENTMCNC: 29.5 PG
MCHC RBC-ENTMCNC: 32.7 G/DL
MCV RBC AUTO: 90.2 FL
MEV IGG FLD QL IA: 22.8 AU/ML
MEV IGG+IGM SER-IMP: POSITIVE
MICROSCOPIC-UA: NORMAL
MONOCYTES # BLD AUTO: 0.35 K/UL
MONOCYTES NFR BLD AUTO: 7 %
MUV AB SER-ACNC: NEGATIVE
MUV IGG SER QL IA: <5 AU/ML
NEUTROPHILS # BLD AUTO: 2.63 K/UL
NEUTROPHILS NFR BLD AUTO: 52.9 %
NITRITE URINE: NEGATIVE
NONHDLC SERPL-MCNC: 109 MG/DL
PH URINE: 7.5
PLATELET # BLD AUTO: 229 K/UL
POTASSIUM SERPL-SCNC: 4.5 MMOL/L
PROT SERPL-MCNC: 6.7 G/DL
PROTEIN URINE: NEGATIVE MG/DL
RBC # BLD: 4.07 M/UL
RBC # FLD: 14.1 %
RED BLOOD CELLS URINE: 2 /HPF
REVIEW: NORMAL
RUBV IGG FLD-ACNC: 10.8 INDEX
RUBV IGG SER-IMP: POSITIVE
SODIUM SERPL-SCNC: 141 MMOL/L
SPECIFIC GRAVITY URINE: 1.01
T4 SERPL-MCNC: 5.9 UG/DL
TRIGL SERPL-MCNC: 51 MG/DL
TSH SERPL-ACNC: 1.89 UIU/ML
UROBILINOGEN URINE: 0.2 MG/DL
VIT B12 SERPL-MCNC: 933 PG/ML
WBC # FLD AUTO: 4.98 K/UL
WHITE BLOOD CELLS URINE: 0 /HPF
YEAST-LIKE CELLS: PRESENT

## 2025-09-16 ENCOUNTER — APPOINTMENT (OUTPATIENT)
Dept: OBGYN | Facility: CLINIC | Age: 50
End: 2025-09-16
Payer: COMMERCIAL

## 2025-09-16 ENCOUNTER — NON-APPOINTMENT (OUTPATIENT)
Age: 50
End: 2025-09-16

## 2025-09-16 VITALS
DIASTOLIC BLOOD PRESSURE: 74 MMHG | RESPIRATION RATE: 16 BRPM | SYSTOLIC BLOOD PRESSURE: 114 MMHG | WEIGHT: 117 LBS | BODY MASS INDEX: 22.97 KG/M2 | HEIGHT: 60 IN | HEART RATE: 78 BPM

## 2025-09-16 DIAGNOSIS — Z12.4 ENCOUNTER FOR SCREENING FOR MALIGNANT NEOPLASM OF CERVIX: ICD-10-CM

## 2025-09-16 DIAGNOSIS — Z01.419 ENCOUNTER FOR GYNECOLOGICAL EXAMINATION (GENERAL) (ROUTINE) W/OUT ABNORMAL FINDINGS: ICD-10-CM

## 2025-09-16 DIAGNOSIS — R92.30 DENSE BREASTS, UNSPECIFIED: ICD-10-CM

## 2025-09-16 PROCEDURE — 99396 PREV VISIT EST AGE 40-64: CPT

## 2025-09-18 LAB — CYTOLOGY CVX/VAG DOC THIN PREP: NORMAL
